# Patient Record
Sex: FEMALE | Race: BLACK OR AFRICAN AMERICAN | Employment: FULL TIME | URBAN - METROPOLITAN AREA
[De-identification: names, ages, dates, MRNs, and addresses within clinical notes are randomized per-mention and may not be internally consistent; named-entity substitution may affect disease eponyms.]

---

## 2017-06-14 ENCOUNTER — OFFICE VISIT (OUTPATIENT)
Dept: INTERNAL MEDICINE CLINIC | Age: 52
End: 2017-06-14

## 2017-06-14 VITALS
SYSTOLIC BLOOD PRESSURE: 115 MMHG | WEIGHT: 171.1 LBS | TEMPERATURE: 98.1 F | DIASTOLIC BLOOD PRESSURE: 68 MMHG | HEIGHT: 64 IN | BODY MASS INDEX: 29.21 KG/M2 | OXYGEN SATURATION: 98 % | RESPIRATION RATE: 16 BRPM | HEART RATE: 61 BPM

## 2017-06-14 DIAGNOSIS — D22.9 ATYPICAL MOLE: ICD-10-CM

## 2017-06-14 DIAGNOSIS — Z12.11 SCREENING FOR COLON CANCER: ICD-10-CM

## 2017-06-14 DIAGNOSIS — Z12.39 SCREENING FOR BREAST CANCER: ICD-10-CM

## 2017-06-14 DIAGNOSIS — I83.90 VARICOSE VEIN OF LEG: Primary | ICD-10-CM

## 2017-06-14 NOTE — MR AVS SNAPSHOT
Visit Information Date & Time Provider Department Dept. Phone Encounter #  
 6/14/2017  1:45 PM Puma Rojas MD [address] [facility] [phone] [id number] Follow-up Instructions Return in about 6 months (around 12/14/2017), or if symptoms worsen or fail to improve. Upcoming Health Maintenance Date Due DTaP/Tdap/Td series (1 - Tdap) 12/14/1987 BREAST CANCER SCRN MAMMOGRAM 12/14/2016 FOBT Q 1 YEAR AGE 50-75 12/14/2016 INFLUENZA AGE 9 TO ADULT 8/1/2017 PAP AKA CERVICAL CYTOLOGY 5/12/2019 Allergies as of 6/14/2017  Review Complete On: 6/14/2017 By: Puma Rojas MD  
  
 Severity Noted Reaction Type Reactions Nut Flavor High 11/19/2012    Anaphylaxis Cafergot [Ergotamine-caffeine]  11/19/2012    Nausea Only Current Immunizations  Never Reviewed No immunizations on file. Not reviewed this visit You Were Diagnosed With   
  
 Codes Comments Varicose vein of leg    -  Primary ICD-10-CM: I83.90 ICD-9-CM: 454.9 Atypical mole     ICD-10-CM: D22.9 ICD-9-CM: 216.9 Screening for breast cancer     ICD-10-CM: Z12.39 
ICD-9-CM: V76.10 Screening for colon cancer     ICD-10-CM: Z12.11 ICD-9-CM: V76.51 Vitals BP Pulse Temp Resp Height(growth percentile) Weight(growth percentile)  
 115/68 (BP 1 Location: Right arm, BP Patient Position: Sitting) 61 98.1 °F (36.7 °C) (Oral) 16 5' 4\" (1.626 m) 171 lb 1.6 oz (77.6 kg) SpO2 BMI OB Status Smoking Status 98% 29.37 kg/m2 Having regular periods Never Smoker Vitals History BMI and BSA Data Body Mass Index Body Surface Area  
 29.37 kg/m 2 1.87 m 2 Preferred Pharmacy Pharmacy Name Phone [address] [address] Your Updated Medication List  
  
   
This list is accurate as of: 6/14/17  2:43 PM.  Always use your most recent med list.  
  
  
  
  
 clobetasol 0.05 % ointment Commonly known as:  Ariadna Rape Apply  to affected area two (2) times a day. We Performed the Following CBC W/O DIFF [66388 CPT(R)] METABOLIC PANEL, COMPREHENSIVE [87229 CPT(R)] OCCULT BLOOD, IMMUNOASSAY (FIT) [id number] CPT(R)] REFERRAL TO DERMATOLOGY [REF19 Custom] Comments:  
 ballard REFERRAL TO GASTROENTEROLOGY [AKE37 Custom] Follow-up Instructions Return in about 6 months (around 12/14/2017), or if symptoms worsen or fail to improve. Referral Information Referral ID Referred By Referred To  
  
 [id number] Dean Cadena, [address] [facility] Art Reis MD   
   [facility] 56 [address] [facility], [address] [place] [address] Visits Status Start Date End Date 1 New Request 6/14/17 6/14/18 If your referral has a status of pending review or denied, additional information will be sent to support the outcome of this decision. Referral ID Referred By Referred To  
 [id number] Pamela Meek MD  
   [address] [facility] [address] [place] Al[place], [address] Phone: [phone] Fax: [phone] Visits Status Start Date End Date 1 New Request 6/14/17 6/14/18 If your referral has a status of pending review or denied, additional information will be sent to support the outcome of this decision. Patient Instructions [employer] Activation Thank you for requesting access to [email or web address]. Please follow the instructions below to securely access and download your online medical record. [email or web address] allows you to send messages to your doctor, view your test results, renew your prescriptions, schedule appointments, and more. How Do I Sign Up? 1. In your internet browser, go to [email or web address] 
2. Click on the First Time User? Click Here link in the Sign In box. You will be redirect to the New Member Sign Up page. 3. Enter your [email or web address] Access Code exactly as it appears below.  You will not need to use this code after youve completed the sign-up process. If you do not sign up before the expiration date, you must request a new code. MobileTag Access Code: [email or web address] Expires: 2017  2:39 PM (This is the date your MobileTag access code will ) 4. Enter the last four digits of your Social Security Number (xxxx) and Date of Birth (mm/dd/yyyy) as indicated and click Submit. You will be taken to the next sign-up page. 5. Create a CitalDoct ID. This will be your MobileTag login ID and cannot be changed, so think of one that is secure and easy to remember. 6. Create a MobileTag password. You can change your password at any time. 7. Enter your Password Reset Question and Answer. This can be used at a later time if you forget your password. 8. Enter your e-mail address. You will receive e-mail notification when new information is available in [address] 9. Click Sign Up. You can now view and download portions of your medical record. 10. Click the Download Summary menu link to download a portable copy of your medical information. Additional Information If you have questions, please visit the Frequently Asked Questions section of the MobileTag website at [email or web address] Remember, MobileTag is NOT to be used for urgent needs. For medical emergencies, dial 911. Introducing [facility]! Lion Meza introduces MobileTag patient portal. Now you can access parts of your medical record, email your doctor's office, and request medication refills online. 1. In your internet browser, go to [email or web address] 2. Click on the First Time User? Click Here link in the Sign In box. You will see the New Member Sign Up page. 3. Enter your MobileTag Access Code exactly as it appears below. You will not need to use this code after youve completed the sign-up process. If you do not sign up before the expiration date, you must request a new code. · Onformonics Access Code: [email or web address] Expires: 9/12/2017  2:39 PM 
 
4. Enter the last four digits of your Social Security Number (xxxx) and Date of Birth (mm/dd/yyyy) as indicated and click Submit. You will be taken to the next sign-up page. 5. Create a Onformonics ID. This will be your Onformonics login ID and cannot be changed, so think of one that is secure and easy to remember. 6. Create a Onformonics password. You can change your password at any time. 7. Enter your Password Reset Question and Answer. This can be used at a later time if you forget your password. 8. Enter your e-mail address. You will receive e-mail notification when new information is available in [address] 9. Click Sign Up. You can now view and download portions of your medical record. 10. Click the Download Summary menu link to download a portable copy of your medical information. If you have questions, please visit the Frequently Asked Questions section of the Onformonics website. Remember, Onformonics is NOT to be used for urgent needs. For medical emergencies, dial 911. Now available from your iPhone and Android! Please provide this summary of care documentation to your next provider. Your primary care clinician is listed as Antonio Arias. If you have any questions after today's visit, please call [phone].

## 2017-06-14 NOTE — PROGRESS NOTES
Chief Complaint   Patient presents with    Mole    Diabetes    Hypertension     1. Have you been to the ER, urgent care clinic since your last visit? Hospitalized since your last visit? No    2. Have you seen or consulted any other health care providers outside of the [address] [place] [address] since your last visit? Include any pap smears or colon screening.  No

## 2017-06-14 NOTE — PROGRESS NOTES
Tal Schmitz is a 49 y.o. female and presents with Mole and Ankle swelling  . Subjective:  She needs a physical exam    She has also recurrent ankle swelling    She has a nevus on the rt.lower back present for a few days    Health maintenance suggests the needs for a mammogram    Health maintenance suggests the needs for a test for occult blood        Review of Systems  Constitutional: negative for fevers, chills, anorexia and weight loss  Eyes:   negative for visual disturbance and irritation  ENT:   negative for tinnitus,sore throat,nasal congestion,ear pains. hoarseness  Respiratory:  negative for cough, hemoptysis, dyspnea,wheezing  CV:   negative for chest pain, palpitations, lower extremity edema  GI:   negative for nausea, vomiting, diarrhea, abdominal pain,melena  Endo:               negative for polyuria,polydipsia,polyphagia,heat intolerance  Genitourinary: negative for frequency, dysuria and hematuria  Integument:  negative for rash and pruritus  Hematologic:  negative for easy bruising and gum/nose bleeding  Musculoskel: negative for myalgias, arthralgias, back pain, muscle weakness, joint pain  Neurological:  negative for headaches, dizziness, vertigo, memory problems and gait   Behavl/Psych: negative for feelings of anxiety, depression, mood changes    No past medical history on file.   Past Surgical History:   Procedure Laterality Date    HX GYN       Social History     Social History    Marital status: SINGLE     Spouse name: N/A    Number of children: N/A    Years of education: N/A     Social History Main Topics    Smoking status: Never Smoker    Smokeless tobacco: None    Alcohol use No    Drug use: None    Sexual activity: Not Asked     Other Topics Concern    None     Social History Narrative     Family History   Problem Relation Age of Onset    Heart Disease Father      Current Outpatient Prescriptions   Medication Sig Dispense Refill    clobetasol (TEMOVATE) 0.05 % ointment Apply  to affected area two (2) times a day. 45 g 12     Allergies   Allergen Reactions    Nut Flavor Anaphylaxis    Cafergot [Ergotamine-Caffeine] Nausea Only       Objective:  Visit Vitals    /68 (BP 1 Location: Right arm, BP Patient Position: Sitting)    Pulse 61    Temp 98.1 °F (36.7 °C) (Oral)    Resp 16    Ht 5' 4\" (1.626 m)    Wt 171 lb 1.6 oz (77.6 kg)    SpO2 98%    BMI 29.37 kg/m2     Physical Exam:   General appearance - alert, well appearing, and in no distress  Mental status - alert, oriented to person, place, and time  EYE-MIRA, EOMI, corneas normal, no foreign bodies  ENT-ENT exam normal, no neck nodes or sinus tenderness  Nose - normal and patent, no erythema, discharge or polyps  Breast-no masses felt  Mouth - mucous membranes moist, pharynx normal without lesions  Neck - supple, no significant adenopathy   Chest - clear to auscultation, no wheezes, rales or rhonchi, symmetric air entry   Heart - normal rate, regular rhythm, normal S1, S2, no murmurs, rubs, clicks or gallops   Abdomen - soft, nontender, nondistended, no masses or organomegaly  Lymph- no adenopathy palpable  Ext-peripheral pulses normal, 2+edema rt.lower leg pedal edema, no clubbing or cyanosis  Skin-Warm and dry.  no hyperpigmentation, vitiligo, or suspicious lesions  Neuro -alert, oriented, normal speech, no focal findings or movement disorder noted  Neck-normal C-spine, no tenderness, full ROM without pain  Feet-no nail deformities or callus formation with good pulses noted  1cm nevus noted rt.flank    Results for orders placed or performed in visit on 95/26/33   METABOLIC PANEL, COMPREHENSIVE   Result Value Ref Range    Glucose 87 65 - 99 mg/dL    BUN 13 6 - 24 mg/dL    Creatinine 0.78 0.57 - 1.00 mg/dL    GFR est non-AA 90 >59 mL/min/1.73    GFR est  >59 mL/min/1.73    BUN/Creatinine ratio 17 9 - 23    Sodium 137 134 - 144 mmol/L    Potassium 5.0 3.5 - 5.2 mmol/L    Chloride 103 97 - 108 mmol/L    CO2 21 18 - 29 mmol/L    Calcium 9.4 8.7 - 10.2 mg/dL    Protein, total 7.4 6.0 - 8.5 g/dL    Albumin 4.3 3.5 - 5.5 g/dL    GLOBULIN, TOTAL 3.1 1.5 - 4.5 g/dL    A-G Ratio 1.4 1.1 - 2.5    Bilirubin, total 0.4 0.0 - 1.2 mg/dL    Alk. phosphatase 37 (L) 39 - 117 IU/L    AST (SGOT) 20 0 - 40 IU/L    ALT (SGPT) 14 0 - 32 IU/L   CBC W/O DIFF   Result Value Ref Range    WBC 6.1 3.4 - 10.8 x10E3/uL    RBC 4.92 3.77 - 5.28 x10E6/uL    HGB 11.7 11.1 - 15.9 g/dL    HCT 39.4 34.0 - 46.6 %    MCV 80 79 - 97 fL    MCH 23.8 (L) 26.6 - 33.0 pg    MCHC 29.7 (L) 31.5 - 35.7 g/dL    RDW 17.7 (H) 12.3 - 15.4 %    PLATELET 190 916 - 331 x10E3/uL   AMB POC LIPID PROFILE   Result Value Ref Range    Cholesterol (POC) 170     Triglycerides (POC) 68     HDL Cholesterol (POC) 56     LDL Cholesterol (POC) 100     Non-HDL Goal (POC) 114     TChol/HDL Ratio (POC) 3.0    AMB POC GLUCOSE BLOOD, BY GLUCOSE MONITORING DEVICE   Result Value Ref Range    Glucose  mg/dL   AMB POC HEMOGLOBIN A1C   Result Value Ref Range    Hemoglobin A1c (POC) 5.6 %       Assessment/Plan:    ICD-10-CM ICD-9-CM    1. Varicose vein of leg X87.82 585.2 METABOLIC PANEL, COMPREHENSIVE      CBC W/O DIFF   2. Atypical mole D22.9 216.9 REFERRAL TO DERMATOLOGY   3. Screening for breast cancer Z12.39 V76.10 OCCULT BLOOD, IMMUNOASSAY (FIT)   4.  Screening for colon cancer Z12.11 V76.51 REFERRAL TO GASTROENTEROLOGY     Orders Placed This Encounter    METABOLIC PANEL, COMPREHENSIVE    CBC W/O DIFF    OCCULT BLOOD, IMMUNOASSAY (FIT)    REFERRAL TO DERMATOLOGY     Referral Priority:   Routine     Referral Type:   Consultation     Referral Reason:   Specialty Services Required     Referral Location:   Chuck Cummings MD     Referred to Provider:   Chuck Cummings MD     Number of Visits Requested:   1    REFERRAL TO GASTROENTEROLOGY     Referral Priority:   Routine     Referral Type:   Consultation     Referral Reason:   Specialty Services Required     Referred to Provider:   Yasmine Rodriguez Myranda Mccray MD     Number of Visits Requested:   1     lose weight, increase physical activity, follow low fat diet, follow low salt diet,Take 81mg aspirin daily  Patient Instructions   MyChart Activation    Thank you for requesting access to [employer]. Please follow the instructions below to securely access and download your online medical record. [employer] allows you to send messages to your doctor, view your test results, renew your prescriptions, schedule appointments, and more. How Do I Sign Up? 1. In your internet browser, go to [email or web address]  2. Click on the First Time User? Click Here link in the Sign In box. You will be redirect to the New Member Sign Up page. 3. Enter your [employer] Access Code exactly as it appears below. You will not need to use this code after youve completed the sign-up process. If you do not sign up before the expiration date, you must request a new code. [employer] Access Code: [email or web address]  Expires: 2017  2:39 PM (This is the date your [employer] access code will )    4. Enter the last four digits of your Social Security Number (xxxx) and Date of Birth (mm/dd/yyyy) as indicated and click Submit. You will be taken to the next sign-up page. 5. Create a [employer] ID. This will be your [employer] login ID and cannot be changed, so think of one that is secure and easy to remember. 6. Create a [employer] password. You can change your password at any time. 7. Enter your Password Reset Question and Answer. This can be used at a later time if you forget your password. 8. Enter your e-mail address. You will receive e-mail notification when new information is available in [address] 9. Click Sign Up. You can now view and download portions of your medical record. 10. Click the Download Summary menu link to download a portable copy of your medical information.     Additional Information    If you have questions, please visit the Frequently Asked Questions section of the [employer] website at [email or web address] Remember, [employer] is NOT to be used for urgent needs. For medical emergencies, dial 911. Follow-up Disposition:  Return in about 6 months (around 12/14/2017), or if symptoms worsen or fail to improve. I have reviewed with the patient details of the assessment and plan and all questions were answered. Relevent patient education was performed. The most recent lab findings were reviewed with the patient. An After Visit Summary was printed and given to the patient.

## 2017-06-14 NOTE — PATIENT INSTRUCTIONS
[employer] Activation    Thank you for requesting access to [employer]. Please follow the instructions below to securely access and download your online medical record. [employer] allows you to send messages to your doctor, view your test results, renew your prescriptions, schedule appointments, and more. How Do I Sign Up? 1. In your internet browser, go to [email or web address]  2. Click on the First Time User? Click Here link in the Sign In box. You will be redirect to the New Member Sign Up page. 3. Enter your [employer] Access Code exactly as it appears below. You will not need to use this code after youve completed the sign-up process. If you do not sign up before the expiration date, you must request a new code. [employer] Access Code: [email or web address]  Expires: 2017  2:39 PM (This is the date your [employer] access code will )    4. Enter the last four digits of your Social Security Number (xxxx) and Date of Birth (mm/dd/yyyy) as indicated and click Submit. You will be taken to the next sign-up page. 5. Create a [employer] ID. This will be your [employer] login ID and cannot be changed, so think of one that is secure and easy to remember. 6. Create a [employer] password. You can change your password at any time. 7. Enter your Password Reset Question and Answer. This can be used at a later time if you forget your password. 8. Enter your e-mail address. You will receive e-mail notification when new information is available in [address] 9. Click Sign Up. You can now view and download portions of your medical record. 10. Click the Download Summary menu link to download a portable copy of your medical information. Additional Information    If you have questions, please visit the Frequently Asked Questions section of the [employer] website at [email or web address] Remember, [employer] is NOT to be used for urgent needs. For medical emergencies, dial 911.

## 2017-06-15 LAB
ALBUMIN SERPL-MCNC: 4.5 G/DL (ref 3.5–5.5)
ALBUMIN/GLOB SERPL: 1.4 {RATIO} (ref 1.2–2.2)
ALP SERPL-CCNC: 48 IU/L (ref 39–117)
ALT SERPL-CCNC: 22 IU/L (ref 0–32)
AST SERPL-CCNC: 22 IU/L (ref 0–40)
BILIRUB SERPL-MCNC: 0.3 MG/DL (ref 0–1.2)
BUN SERPL-MCNC: 17 MG/DL (ref 6–24)
BUN/CREAT SERPL: 22 (ref 9–23)
CALCIUM SERPL-MCNC: 9.5 MG/DL (ref 8.7–10.2)
CHLORIDE SERPL-SCNC: 101 MMOL/L (ref 96–106)
CO2 SERPL-SCNC: 23 MMOL/L (ref 18–29)
CREAT SERPL-MCNC: 0.79 MG/DL (ref 0.57–1)
ERYTHROCYTE [DISTWIDTH] IN BLOOD BY AUTOMATED COUNT: 17.9 % (ref 12.3–15.4)
GLOBULIN SER CALC-MCNC: 3.2 G/DL (ref 1.5–4.5)
GLUCOSE SERPL-MCNC: 90 MG/DL (ref 65–99)
HCT VFR BLD AUTO: 38.6 % (ref 34–46.6)
HGB BLD-MCNC: 11.7 G/DL (ref 11.1–15.9)
MCH RBC QN AUTO: 23.9 PG (ref 26.6–33)
MCHC RBC AUTO-ENTMCNC: 30.3 G/DL (ref 31.5–35.7)
MCV RBC AUTO: 79 FL (ref 79–97)
PLATELET # BLD AUTO: 294 X10E3/UL (ref 150–379)
POTASSIUM SERPL-SCNC: 4.7 MMOL/L (ref 3.5–5.2)
PROT SERPL-MCNC: 7.7 G/DL (ref 6–8.5)
RBC # BLD AUTO: 4.89 X10E6/UL (ref 3.77–5.28)
SODIUM SERPL-SCNC: 139 MMOL/L (ref 134–144)
WBC # BLD AUTO: 7.5 X10E3/UL (ref 3.4–10.8)

## 2017-07-19 ENCOUNTER — OFFICE VISIT (OUTPATIENT)
Dept: INTERNAL MEDICINE CLINIC | Age: 52
End: 2017-07-19

## 2017-07-19 VITALS
TEMPERATURE: 99 F | HEART RATE: 60 BPM | RESPIRATION RATE: 14 BRPM | SYSTOLIC BLOOD PRESSURE: 120 MMHG | DIASTOLIC BLOOD PRESSURE: 80 MMHG | WEIGHT: 174 LBS | HEIGHT: 64 IN | BODY MASS INDEX: 29.71 KG/M2 | OXYGEN SATURATION: 97 %

## 2017-07-19 DIAGNOSIS — J01.90 ACUTE NON-RECURRENT SINUSITIS, UNSPECIFIED LOCATION: Primary | ICD-10-CM

## 2017-07-19 DIAGNOSIS — H91.90 HEARING DECREASED, UNSPECIFIED LATERALITY: ICD-10-CM

## 2017-07-19 RX ORDER — FLUTICASONE PROPIONATE 50 MCG
2 SPRAY, SUSPENSION (ML) NASAL DAILY
Qty: 1 BOTTLE | Refills: 12 | Status: SHIPPED | OUTPATIENT
Start: 2017-07-19 | End: 2021-04-03

## 2017-07-19 RX ORDER — CETIRIZINE HCL 10 MG
10 TABLET ORAL DAILY
Qty: 30 TAB | Refills: 12 | Status: SHIPPED | OUTPATIENT
Start: 2017-07-19 | End: 2021-04-03

## 2017-07-19 NOTE — PATIENT INSTRUCTIONS
[employer] Activation    Thank you for requesting access to [employer]. Please follow the instructions below to securely access and download your online medical record. [employer] allows you to send messages to your doctor, view your test results, renew your prescriptions, schedule appointments, and more. How Do I Sign Up? 1. In your internet browser, go to [email or web address].[email or web address] Company Limited  2. Click on the First Time User? Click Here link in the Sign In box. You will be redirect to the New Member Sign Up page. 3. Enter your [employer] Access Code exactly as it appears below. You will not need to use this code after youve completed the sign-up process. If you do not sign up before the expiration date, you must request a new code. [employer] Access Code: Activation code not generated  Current [employer] Status: Patient Declined (This is the date your [employer] access code will )    4. Enter the last four digits of your Social Security Number (xxxx) and Date of Birth (mm/dd/yyyy) as indicated and click Submit. You will be taken to the next sign-up page. 5. Create a [employer] ID. This will be your [employer] login ID and cannot be changed, so think of one that is secure and easy to remember. 6. Create a [employer] password. You can change your password at any time. 7. Enter your Password Reset Question and Answer. This can be used at a later time if you forget your password. 8. Enter your e-mail address. You will receive e-mail notification when new information is available in [address] 9. Click Sign Up. You can now view and download portions of your medical record. 10. Click the Download Summary menu link to download a portable copy of your medical information. Additional Information    If you have questions, please visit the Frequently Asked Questions section of the [employer] website at [email or web address] Remember, [employer] is NOT to be used for urgent needs. For medical emergencies, dial 911.

## 2017-07-19 NOTE — MR AVS SNAPSHOT
Visit Information Date & Time Provider Department Dept. Phone Encounter #  
 7/19/2017 11:30 AM Larence Spatz., MD [address] [place] [phone] [id number] Follow-up Instructions Return in about 3 months (around 10/19/2017), or if symptoms worsen or fail to improve. Follow-up and Disposition History Your Appointments 7/19/2017 11:30 AM  
ACUTE CARE with Larence Spatz., MD  
[facility] - [place] ([address]) Appt Note: ear issues [address] [facility] [facility] [address] [address]  
[phone]  
  
   
 [address] [place] [address],[address] [place] [phone] Upcoming Health Maintenance Date Due DTaP/Tdap/Td series (1 - Tdap) 12/14/1987 BREAST CANCER SCRN MAMMOGRAM 12/14/2016 FOBT Q 1 YEAR AGE 50-75 12/14/2016 INFLUENZA AGE 9 TO ADULT 8/1/2017 PAP AKA CERVICAL CYTOLOGY 5/12/2019 Allergies as of 7/19/2017  Review Complete On: 7/19/2017 By: Larence Spatz., MD  
  
 Severity Noted Reaction Type Reactions Nut Flavor High 11/19/2012    Anaphylaxis Nuts [Tree Nut] High 07/19/2017    Anaphylaxis Cafergot [Ergotamine-caffeine]  11/19/2012    Nausea Only Current Immunizations  Never Reviewed No immunizations on file. Not reviewed this visit You Were Diagnosed With   
  
 Codes Comments Acute non-recurrent sinusitis, unspecified location    -  Primary ICD-10-CM: J01.90 ICD-9-CM: 461.9 Vitals BP Pulse Temp Resp Height(growth percentile) Weight(growth percentile) 120/80 60 99 °F (37.2 °C) (Oral) 14 5' 4\" (1.626 m) 174 lb (78.9 kg) SpO2 BMI OB Status Smoking Status 97% 29.87 kg/m2 Having regular periods Never Smoker BMI and BSA Data Body Mass Index Body Surface Area  
 29.87 kg/m 2 1.89 m 2 Preferred Pharmacy Pharmacy Name Phone [address] [address] Your Updated Medication List  
  
   
This list is accurate as of: 17 11:01 AM.  Always use your most recent med list.  
  
  
  
  
 cetirizine 10 mg tablet Commonly known as:  ZYRTEC Take 1 Tab by mouth daily. clobetasol 0.05 % ointment Commonly known as:  Denisha Viramontes Apply  to affected area two (2) times a day. fluticasone 50 mcg/actuation nasal spray Commonly known as:  Elian Rivera 2 Sprays by Both Nostrils route daily. Prescriptions Printed Refills  
 fluticasone (FLONASE) 50 mcg/actuation nasal spray 12 Si Sprays by Both Nostrils route daily. Class: Print Route: Both Nostrils  
 cetirizine (ZYRTEC) 10 mg tablet 12 Sig: Take 1 Tab by mouth daily. Class: Print Route: Oral  
  
Follow-up Instructions Return in about 3 months (around 10/19/2017), or if symptoms worsen or fail to improve. Patient Instructions [employer] Activation Thank you for requesting access to [employer]. Please follow the instructions below to securely access and download your online medical record. [employer] allows you to send messages to your doctor, view your test results, renew your prescriptions, schedule appointments, and more. How Do I Sign Up? 1. In your internet browser, go to [email or web address] 
2. Click on the First Time User? Click Here link in the Sign In box. You will be redirect to the New Member Sign Up page. 3. Enter your [employer] Access Code exactly as it appears below. You will not need to use this code after youve completed the sign-up process. If you do not sign up before the expiration date, you must request a new code. [employer] Access Code: Activation code not generated Current [employer] Status: Patient Declined (This is the date your [employer] access code will ) 4. Enter the last four digits of your Social Security Number (xxxx) and Date of Birth (mm/dd/yyyy) as indicated and click Submit.  You will be taken to the next sign-up page. 5. Create a [employer] ID. This will be your [employer] login ID and cannot be changed, so think of one that is secure and easy to remember. 6. Create a [employer] password. You can change your password at any time. 7. Enter your Password Reset Question and Answer. This can be used at a later time if you forget your password. 8. Enter your e-mail address. You will receive e-mail notification when new information is available in [address]. 9. Click Sign Up. You can now view and download portions of your medical record. 10. Click the Download Summary menu link to download a portable copy of your medical information. Additional Information If you have questions, please visit the Frequently Asked Questions section of the [employer] website at [email or web address] Remember, [employer] is NOT to be used for urgent needs. For medical emergencies, dial 911. Please provide this summary of care documentation to your next provider. Your primary care clinician is listed as Sandy Roman. If you have any questions after today's visit, please call [phone].

## 2017-07-19 NOTE — PROGRESS NOTES
Kimberley Hensley is a 49 y.o. female and presents with Other (ear problem)  . Subjective:  She has had decreased hearing sensation after swimming recently. She has no pains reported. Review of Systems  Constitutional: negative for fevers, chills, anorexia and weight loss  Eyes:   negative for visual disturbance and irritation  ENT:   negative for tinnitus,sore throat,nasal congestion,ear pains. hoarseness  Respiratory:  negative for cough, hemoptysis, dyspnea,wheezing  CV:   negative for chest pain, palpitations, lower extremity edema  GI:   negative for nausea, vomiting, diarrhea, abdominal pain,melena  Endo:               negative for polyuria,polydipsia,polyphagia,heat intolerance  Genitourinary: negative for frequency, dysuria and hematuria  Integument:  negative for rash and pruritus  Hematologic:  negative for easy bruising and gum/nose bleeding  Musculoskel: negative for myalgias, arthralgias, back pain, muscle weakness, joint pain  Neurological:  negative for headaches, dizziness, vertigo, memory problems and gait   Behavl/Psych: negative for feelings of anxiety, depression, mood changes    History reviewed. No pertinent past medical history. Past Surgical History:   Procedure Laterality Date    HX GYN       Social History     Social History    Marital status: SINGLE     Spouse name: N/A    Number of children: N/A    Years of education: N/A     Social History Main Topics    Smoking status: Never Smoker    Smokeless tobacco: Never Used    Alcohol use No    Drug use: None    Sexual activity: Not Asked     Other Topics Concern    None     Social History Narrative     Family History   Problem Relation Age of Onset    Heart Disease Father      Current Outpatient Prescriptions   Medication Sig Dispense Refill    clobetasol (TEMOVATE) 0.05 % ointment Apply  to affected area two (2) times a day.  45 g 12     Allergies   Allergen Reactions    Nut Flavor Anaphylaxis    Nuts [Tree Nut] Anaphylaxis  Cafergot [Ergotamine-Caffeine] Nausea Only       Objective:  Visit Vitals    /80    Pulse 60    Temp 99 °F (37.2 °C) (Oral)    Resp 14    Ht 5' 4\" (1.626 m)    Wt 174 lb (78.9 kg)    SpO2 97%    BMI 29.87 kg/m2     Physical Exam:   General appearance - alert, well appearing, and in no distress  Mental status - alert, oriented to person, place, and time  EYE-MIRA, EOMI, corneas normal, no foreign bodies  ENT-ENT exam normal, no neck nodes or sinus tenderness  Nose - normal and patent, no erythema, discharge or polyps  Mouth - mucous membranes moist, pharynx normal without lesions  Neck - supple, no significant adenopathy   Chest - clear to auscultation, no wheezes, rales or rhonchi, symmetric air entry   Heart - normal rate, regular rhythm, normal S1, S2, no murmurs, rubs, clicks or gallops   Abdomen - soft, nontender, nondistended, no masses or organomegaly  Lymph- no adenopathy palpable  Ext-peripheral pulses normal, no pedal edema, no clubbing or cyanosis  Skin-Warm and dry. no hyperpigmentation, vitiligo, or suspicious lesions  Neuro -alert, oriented, normal speech, no focal findings or movement disorder noted  Neck-normal C-spine, no tenderness, full ROM without pain  Feet-no nail deformities or callus formation with good pulses noted      Results for orders placed or performed in visit on 24/24/75   METABOLIC PANEL, COMPREHENSIVE   Result Value Ref Range    Glucose 90 65 - 99 mg/dL    BUN 17 6 - 24 mg/dL    Creatinine 0.79 0.57 - 1.00 mg/dL    GFR est non-AA 88 >59 mL/min/1.73    GFR est  >59 mL/min/1.73    BUN/Creatinine ratio 22 9 - 23    Sodium 139 134 - 144 mmol/L    Potassium 4.7 3.5 - 5.2 mmol/L    Chloride 101 96 - 106 mmol/L    CO2 23 18 - 29 mmol/L    Calcium 9.5 8.7 - 10.2 mg/dL    Protein, total 7.7 6.0 - 8.5 g/dL    Albumin 4.5 3.5 - 5.5 g/dL    GLOBULIN, TOTAL 3.2 1.5 - 4.5 g/dL    A-G Ratio 1.4 1.2 - 2.2    Bilirubin, total 0.3 0.0 - 1.2 mg/dL    Alk.  phosphatase 48 39 - 117 IU/L    AST (SGOT) 22 0 - 40 IU/L    ALT (SGPT) 22 0 - 32 IU/L   CBC W/O DIFF   Result Value Ref Range    WBC 7.5 3.4 - 10.8 x10E3/uL    RBC 4.89 3.77 - 5.28 x10E6/uL    HGB 11.7 11.1 - 15.9 g/dL    HCT 38.6 34.0 - 46.6 %    MCV 79 79 - 97 fL    MCH 23.9 (L) 26.6 - 33.0 pg    MCHC 30.3 (L) 31.5 - 35.7 g/dL    RDW 17.9 (H) 12.3 - 15.4 %    PLATELET 781 500 - 121 x10E3/uL       Assessment/Plan:  No diagnosis found. No orders of the defined types were placed in this encounter. lose weight, increase physical activity, continue present plan,Take 81mg aspirin daily  There are no Patient Instructions on file for this visit. Follow-up Disposition: Not on File      I have reviewed with the patient details of the assessment and plan and all questions were answered. Relevent patient education was performed. The most recent lab findings were reviewed with the patient. An After Visit Summary was printed and given to the patient.

## 2017-07-31 ENCOUNTER — ANESTHESIA (OUTPATIENT)
Dept: ENDOSCOPY | Age: 52
End: 2017-07-31
Payer: COMMERCIAL

## 2017-07-31 ENCOUNTER — APPOINTMENT (OUTPATIENT)
Dept: GENERAL RADIOLOGY | Age: 52
End: 2017-07-31
Payer: COMMERCIAL

## 2017-07-31 ENCOUNTER — ANESTHESIA EVENT (OUTPATIENT)
Dept: ENDOSCOPY | Age: 52
End: 2017-07-31
Payer: COMMERCIAL

## 2017-07-31 ENCOUNTER — HOSPITAL ENCOUNTER (OUTPATIENT)
Age: 52
Setting detail: OUTPATIENT SURGERY
Discharge: HOME | End: 2017-07-31
Attending: INTERNAL MEDICINE | Admitting: INTERNAL MEDICINE
Payer: COMMERCIAL

## 2017-07-31 VITALS
HEART RATE: 51 BPM | WEIGHT: 174.38 LBS | DIASTOLIC BLOOD PRESSURE: 78 MMHG | HEIGHT: 65 IN | RESPIRATION RATE: 18 BRPM | BODY MASS INDEX: 29.05 KG/M2 | OXYGEN SATURATION: 100 % | SYSTOLIC BLOOD PRESSURE: 128 MMHG | TEMPERATURE: 97.9 F

## 2017-07-31 LAB — HCG UR QL: NEGATIVE

## 2017-07-31 PROCEDURE — 77030013992 HC SNR POLYP ENDOSC BSC -B: Performed by: INTERNAL MEDICINE

## 2017-07-31 PROCEDURE — 88305 TISSUE EXAM BY PATHOLOGIST: CPT | Performed by: INTERNAL MEDICINE

## 2017-07-31 PROCEDURE — 74011000250 HC RX REV CODE- 250

## 2017-07-31 PROCEDURE — 76060000031 HC ANESTHESIA FIRST 0.5 HR: Performed by: INTERNAL MEDICINE

## 2017-07-31 PROCEDURE — 74011250636 HC RX REV CODE- 250/636: Performed by: INTERNAL MEDICINE

## 2017-07-31 PROCEDURE — 81025 URINE PREGNANCY TEST: CPT

## 2017-07-31 PROCEDURE — 74011250636 HC RX REV CODE- 250/636

## 2017-07-31 PROCEDURE — 76040000019: Performed by: INTERNAL MEDICINE

## 2017-07-31 RX ORDER — LORAZEPAM 2 MG/ML
2 INJECTION INTRAMUSCULAR AS NEEDED
Status: DISCONTINUED | OUTPATIENT
Start: 2017-07-31 | End: 2017-07-31 | Stop reason: HOSPADM

## 2017-07-31 RX ORDER — EPINEPHRINE 0.1 MG/ML
1 INJECTION INTRACARDIAC; INTRAVENOUS
Status: DISCONTINUED | OUTPATIENT
Start: 2017-07-31 | End: 2017-07-31 | Stop reason: HOSPADM

## 2017-07-31 RX ORDER — SODIUM CHLORIDE 0.9 % (FLUSH) 0.9 %
5-10 SYRINGE (ML) INJECTION AS NEEDED
Status: DISCONTINUED | OUTPATIENT
Start: 2017-07-31 | End: 2017-07-31 | Stop reason: HOSPADM

## 2017-07-31 RX ORDER — MIDAZOLAM HYDROCHLORIDE 1 MG/ML
5 INJECTION, SOLUTION INTRAMUSCULAR; INTRAVENOUS
Status: DISCONTINUED | OUTPATIENT
Start: 2017-07-31 | End: 2017-07-31 | Stop reason: HOSPADM

## 2017-07-31 RX ORDER — LIDOCAINE HYDROCHLORIDE 20 MG/ML
5 SOLUTION OROPHARYNGEAL AS NEEDED
Status: DISCONTINUED | OUTPATIENT
Start: 2017-07-31 | End: 2017-07-31 | Stop reason: HOSPADM

## 2017-07-31 RX ORDER — ATROPINE SULFATE 0.1 MG/ML
0.5 INJECTION INTRAVENOUS
Status: DISCONTINUED | OUTPATIENT
Start: 2017-07-31 | End: 2017-07-31 | Stop reason: HOSPADM

## 2017-07-31 RX ORDER — FLUMAZENIL 0.1 MG/ML
0.2 INJECTION INTRAVENOUS
Status: DISCONTINUED | OUTPATIENT
Start: 2017-07-31 | End: 2017-07-31 | Stop reason: HOSPADM

## 2017-07-31 RX ORDER — SODIUM CHLORIDE 9 MG/ML
100 INJECTION, SOLUTION INTRAVENOUS CONTINUOUS
Status: DISCONTINUED | OUTPATIENT
Start: 2017-07-31 | End: 2017-07-31 | Stop reason: HOSPADM

## 2017-07-31 RX ORDER — SODIUM CHLORIDE 0.9 % (FLUSH) 0.9 %
5-10 SYRINGE (ML) INJECTION EVERY 8 HOURS
Status: DISCONTINUED | OUTPATIENT
Start: 2017-07-31 | End: 2017-07-31 | Stop reason: HOSPADM

## 2017-07-31 RX ORDER — FENTANYL CITRATE 50 UG/ML
100 INJECTION, SOLUTION INTRAMUSCULAR; INTRAVENOUS ONCE
Status: DISCONTINUED | OUTPATIENT
Start: 2017-07-31 | End: 2017-07-31 | Stop reason: HOSPADM

## 2017-07-31 RX ORDER — DIPHENHYDRAMINE HYDROCHLORIDE 50 MG/ML
50 INJECTION, SOLUTION INTRAMUSCULAR; INTRAVENOUS ONCE
Status: DISCONTINUED | OUTPATIENT
Start: 2017-07-31 | End: 2017-07-31 | Stop reason: HOSPADM

## 2017-07-31 RX ORDER — DEXTROMETHORPHAN/PSEUDOEPHED 2.5-7.5/.8
1.2 DROPS ORAL
Status: DISCONTINUED | OUTPATIENT
Start: 2017-07-31 | End: 2017-07-31 | Stop reason: HOSPADM

## 2017-07-31 RX ORDER — NALOXONE HYDROCHLORIDE 0.4 MG/ML
0.4 INJECTION, SOLUTION INTRAMUSCULAR; INTRAVENOUS; SUBCUTANEOUS
Status: DISCONTINUED | OUTPATIENT
Start: 2017-07-31 | End: 2017-07-31 | Stop reason: HOSPADM

## 2017-07-31 RX ORDER — DEXTROSE MONOHYDRATE AND SODIUM CHLORIDE 5; .9 G/100ML; G/100ML
100 INJECTION, SOLUTION INTRAVENOUS CONTINUOUS
Status: DISCONTINUED | OUTPATIENT
Start: 2017-07-31 | End: 2017-07-31 | Stop reason: HOSPADM

## 2017-07-31 RX ORDER — LIDOCAINE HYDROCHLORIDE 20 MG/ML
INJECTION, SOLUTION EPIDURAL; INFILTRATION; INTRACAUDAL; PERINEURAL AS NEEDED
Status: DISCONTINUED | OUTPATIENT
Start: 2017-07-31 | End: 2017-07-31 | Stop reason: HOSPADM

## 2017-07-31 RX ORDER — PROPOFOL 10 MG/ML
INJECTION, EMULSION INTRAVENOUS AS NEEDED
Status: DISCONTINUED | OUTPATIENT
Start: 2017-07-31 | End: 2017-07-31 | Stop reason: HOSPADM

## 2017-07-31 RX ADMIN — SODIUM CHLORIDE 100 ML/HR: 900 INJECTION, SOLUTION INTRAVENOUS at 08:54

## 2017-07-31 RX ADMIN — LIDOCAINE HYDROCHLORIDE 40 MG: 20 INJECTION, SOLUTION EPIDURAL; INFILTRATION; INTRACAUDAL; PERINEURAL at 09:15

## 2017-07-31 RX ADMIN — PROPOFOL 190 MG: 10 INJECTION, EMULSION INTRAVENOUS at 09:29

## 2017-07-31 NOTE — H&P
G I Procedure Note           Endoscopy History and Physical               Dr. Su Bryant [address] [facility] [address] [address] [id number]  [id number]    1965  49 y.o.  female      Date of Procedure:   Preoperative Diagnosis:       Procedure:    7/31/2017           SCREENING                              Procedure(s):  COLONOSCOPY      Gastroenterologist:  Anesthesia:           Olivia Lozada MD                               MAC            History and procedure indication:  Nargis Roman is a 49 y.o. BLACK OR  female who presents with: SCREENING   including the additional history of Screening ,Screening for colon cancer, ,        No past medical history on file. Prior to Admission medications    Medication Sig Start Date End Date Taking? Authorizing Provider   fluticasone (FLONASE) 50 mcg/actuation nasal spray 2 Sprays by Both Nostrils route daily. 7/19/17   Kwame Dcaosta MD   cetirizine (ZYRTEC) 10 mg tablet Take 1 Tab by mouth daily. 7/19/17   Kwame Dacosta MD   clobetasol (TEMOVATE) 0.05 % ointment Apply  to affected area two (2) times a day. 3/31/14   Kwame Dacosta MD     Allergies   Allergen Reactions    Nut Flavor Anaphylaxis   Catha Sprout Eneida Brilliant Celestia Juan José Nut] Anaphylaxis    Cafergot [Ergotamine-Caffeine] Nausea Only       Past Surgical History:   Procedure Laterality Date    HX GYN       Family History   Problem Relation Age of Onset    Heart Disease Father       Social History   Substance Use Topics    Smoking status: Never Smoker    Smokeless tobacco: Never Used    Alcohol use No                                                      PHYSICAL EXAM   There were no vitals taken for this visit.     General appearance:  alert, well appearing, and in no distress  Mental status:  normal mood, behavior, speech, dress, motor activity and thought processes  Nose: normal and patent, no erythema, discharge or polyps  Mouth:- mucous membranes moist, pharynx normal without lesions                  [x]  No Loose teeth      []    Loose teeth  Finger opening:  []1     []1.5    [] 2     [] 2.5     [x] 3      [] 3.5     [] 4   Mallampati:         [] Class 1     [x] Class 2    [] Class 3      [] Class 4      Neck - supple,      [x] Full ROM [] Decreased ROM  [] Short Neck no significant adenopathy    Chest - clear to auscultation, no wheezes, rales or rhonchi, symmetric air entry  Heart: normal rate, regular rhythm, normal S1, S2, no murmurs, rubs, clicks or gallops  Abdomen: abdomen soft, bowel sounds  [x] normal  [] increased  [] hypoactive                       [] no tenderness  [] epigastric tenderness  [] LLQ tenderness   [] RLQ tenderness                      No masses, organomegaly or guarding. Rectal exam: negative without mass, lesions or tenderness  Extremities: peripheral pulses normal, no pedal edema, no clubbing or cyanosis  Neurologic: Alert and oriented to person, place, and time; normal strength and tone. Normal symmetric reflexes  Normal gait:                                      Assessement:                                 Pre op dx:  SCREENING   Additional medical problems list below   There is no problem list on file for this patient. This note documentation was performed prior to this planned procedure       after a history and physical was performed in the office.  Date: 7/17/17                   Pre Procedure Evaluation (per anesthesia or per h&p)                                                Sedation/Assessment:                                                                                               Mallampati Classification                       *     []Class 1                    []Class 2                    [] Class 3                  [] Class 4                                              ASA classfication         []     Class I: Normally healthy         []     Class II: Patient with mild systemic disease (e.g. hypertension)         []     Class III: Patient with severe systemic disease (e.g. CHF), non-decompensated         []     Class IV: Patient with severe systemic disease, decompensated         []     Class V: Moribund patient, survival unlikely                     Plan:  []  Egd                               [x] Colonoscopy                               [] with Moderate Sedation /Conscious Sedation                                 [x] MAC          Patient stable for planned procedure. See orders.      Ty Fritz MD

## 2017-07-31 NOTE — PROCEDURES
G I Procedure Note            COLONOSCOPY   Dr. Lucy Cuadra office   [facility] Office    [facility]                                   [id number]                                  [id number]   1965                                      49 y.o.                                    female      Procedure Date: 7/31/2017                                                                                                              Pre Op Diagnosis:                     1. SCREENING                                                                                                                                                                          Post Op Diagnosis:                    1. 1.5 peduncalated polyp removed internal       hemorrhoids                                                                    H&p completed: Yes            Anesthesia Assessment: Performed prior to procedure:      No change  Anesthesia Plan: Performed prior to procedure:                   No change       Medications: See Reviewed List and Reconcilation           Informed consent was obtained     Risk Statement:  Prior to the procedure the risks were explained to the patient and/or to the family including but not limited to perforation, bleeding, adverse drug reaction, aspiration, and even the need for possible surgery. A colonoscopy exam is not 100% accurate which may be related to preparation or blind spots during the exam.The possibility that an abnormality and /or cancer could be missed was also discussed as well as alternative x-ray options.          Instrument:    Olympus adult Videocolonoscope                                   Immediate Procedure Reassessment Completed     With the patient in the left lateral position, a rectal examination was performed and the findings were: negative without mass, lesions or tenderness   The Olympus Video colonoscope was inserted under direct vision into the rectum. The colonoscope was passed from the rectum to the cecum, which was identified by the ileocecal valve. The colon findings demonstrated:  ANUS: Anal exam reveals no masses or external hemorrhoids, sphincter tone is normal.   RECTUM: Rectal exam reveals no masses  . SIGMOID COLON: The mucosa is normal with good vascular pattern and without ulcers, diverticula, and polyps. DESCENDING COLON: The mucosa is normal with good vascular pattern and without ulcers, diverticula, and polyps. SPLENIC FLEXURE: The splenic flexure is normal.   TRANSVERSE COLON: The mucosa is normal with good vascular pattern and without ulcers, diverticula, and polyps. HEPATIC FLEXURE: The hepatic flexure is normal.   ASCENDING COLON: The mucosa is normal with good vascular pattern and without ulcers, diverticula, and polyps. CECUM:  The ileocecal valve appears normal.   TERMINAL ILEUM: The terminal ileum was not entered. A polyp was identified. 15  mm in size, located in the sigmoid removed by snare cautery and retrieved for pathology The polypectomy site was reviewed and was free of hemorrhage. The colonoscope was slowly withdrawn >6 minute period and the instrument was retroflexed in the rectum. The rectal findings were:Protruding lesions:     -Internal Hemorrhoids  The patient tolerated the entire procedure well. Blood Loss minimal nominal    For biopsy  Specimen verification by physician and nurse two sources, name,           social security numbers     Colon preparation was good    Recommendations:     - Repeat colonoscopy in 5 years.       Copies sent to   Catalino Resendiz MD  CC:  Vicente Sanchez MD

## 2017-07-31 NOTE — PROGRESS NOTES
Neil Rubalcava  1965  [id number]    Situation:  Verbal report received from: Ronda Blanca RN  Procedure: Procedure(s) with comments:  COLONOSCOPY  ENDOSCOPIC POLYPECTOMY - polypectomy    Background:    Preoperative diagnosis: SCREENING  Postoperative diagnosis: Colon polyp, hemorrhoids    :  Dr. Michael Bean  Assistant(s): Endoscopy Technician-1: Aiden Argueta  Endoscopy RN-1: Thiago Carr RN    Specimens:   ID Type Source Tests Collected by Time Destination   1 : Path - Sigmoid colon polyp Preservative Sigmoid  Benigno Mackey MD 7/31/2017 4748 Pathology     H. Pylori  no    Assessment:  Intra-procedure medications :  Propofol 190 mg      Anesthesia gave intra-procedure sedation and medications, see anesthesia flow sheet yes    Intravenous fluids: NS@ KVO     Vital signs stable     Abdominal assessment: round and soft     Recommendation:  Discharge patient per MD order. Friend here. Permission to share finding with family or friend yes.

## 2017-07-31 NOTE — IP AVS SNAPSHOT
[address] [facility] 
[phone] Patient: Juan Miguel Madrid MRN: [id number] :1965 You are allergic to the following Allergen Reactions Nut Flavor Anaphylaxis Nuts (Tree Nut) Anaphylaxis Cafergot (Ergotamine-Caffeine) Nausea Only Recent Documentation Height Weight Breastfeeding? BMI OB Status Smoking Status 1.638 m 79.1 kg No 29.47 kg/m2 Having regular periods Never Smoker Emergency Contacts Name Discharge Info Relation Home Work Mobile Kane Dubon   [phone] Justino Becker  Child [2] [phone] About your hospitalization You were admitted on:  2017 You last received care in the:  [facility] ENDOSCOPY You were discharged on:  2017 Unit phone number:  [phone] Why you were hospitalized Your primary diagnosis was:  Not on File Providers Seen During Your Hospitalizations Provider Role Specialty Primary office phone Gray Gillis MD Attending Provider Internal Medicine [phone] Your Primary Care Physician (PCP) Primary Care Physician Office Phone Office Fax [address] [phone] Follow-up Information Follow up With Details Comments Contact Info Tasia Klein MD   [address] [place] [address] Avenue 
[phone] Current Discharge Medication List  
  
CONTINUE these medications which have NOT CHANGED Dose & Instructions Dispensing Information Comments Morning Noon Evening Bedtime  
 cetirizine 10 mg tablet Commonly known as:  ZYRTEC Your last dose was: Your next dose is:    
   
   
 Dose:  10 mg Take 1 Tab by mouth daily. Quantity:  30 Tab Refills:  12  
     
   
   
   
  
 clobetasol 0.05 % ointment Commonly known as:  David Dire Your last dose was: Your next dose is: Apply  to affected area two (2) times a day. Quantity:  45 g Refills:  12  
     
   
   
   
  
 fluticasone 50 mcg/actuation nasal spray Commonly known as:  Lila Brand Your last dose was: Your next dose is:    
   
   
 Dose:  2 Spray 2 Sprays by Both Nostrils route daily. Quantity:  1 Bottle Refills:  12 Discharge Instructions Endoscopy Discharge Instructions Dr. Nasim Franciscoton office [phone] Office    [phone] NAME: Everardo Donnelly RECORD [id number]:[id number] PATIENT SS#[id number] YOB: 1965 SEX:  female AGE:  49 y.o. FINAL Discharge Procedure and Diagnosis:   
  
Procedure(s): 
COLONOSCOPY FINDINGS:    
Polyp removed x 1 
hemorrhoids MEDICATIONS CONTINUE CURRENT MEDICATIONS 
 
  NEW MEDICATIONS 1.  
 2.  
 3.  
 
 
 
  
  
 
  
 
Testing Schedule Colonoscopy Screening                                   Recommendations Repeat colonoscopy in 3 years Repeat colonoscopy in 5 years Repeat colonoscopy in 10 years New additional 
Tests Call the office  
([phone]) for the appointment time YOUR NEXT APPOINTMENT WITH DR Shahla Stephen: in  4 week(s). Karli Clement MD  
                 
 
 
                     
                                     
   If you had a colonoscopy the \"C\" indicates specific instructions    
  
x  Ordinarily you may resume your previous diet but your initial diet should be   light. Large meals can cause abdominal discomfort after these procedures. Specific Diet Recommendations: High fiber diet.,  
 
     GERD diet: avoid fried and fatty foods, peppermint, chocolate, alcohol,    
          coffee, citrus fruits and juices, and tomato products. Avoid lying down for 2           to 3  hours after eating.       
            
__x__  You may feel quite tired and need to rest and recuperate for several hours    following these procedures. __x__  Due to the fact that sedation was administered for this procedure, do not drive,   operate machinery or sign legal documents for the next 24 hours. __x__  Mild abdominal pain may be experienced after your procedure, but is should   disappear after several hours. Notify your physician if you have persistent pain,   tenderness or abdominal distension. __x__  C Many patients for the first few hours following the exam may experience        belching or passing gas through the rectum. Walking may help to relieve      
 distention and gas pains. A warm bath or shower will often help with abdominal  cramping.                                                                                        
  
__x__   Lawrence Brandi may return to your normal routine tomorrow, according to how you feel        and depending on your doctors instructions. Be sure to call your doctor to make  an appointment for a post-surgery check-up on the date your doctor has   requested. __x__ C Rectal bleeding or spotting in small amounts may occur with the first bowel   movement following a colonoscopy or sigmoidoscopy. __x__  Lawrence Brandi may experience a numbness or lack of sensation in throat. If present, do not   
 eat or drink. Before eating, test your ability to drink with small sips of water. Y     You may try clear liquids or soups. If you tolerate these, you may then eat solid     food which is not greasy or spicy.  
  
__x__ C   
 IF POLYPS REMOVED: Avoid any blood thinning medication such as plavix,   aspirin or coumadin  NSAIDS (like advil or alleve) for 7 days. __x__  Notify your physician if you cough or vomit blood or experience chest pain. Your biopsy or testing result should be available in 7-10 days Prescription will be electronically sent to your pharmacy you must  
  let your nurse know your pharmacy:  
                                                                                                                               
 
 
     [facility] [address] TO HELP ENSURE A SMOOTH RECOVERY,  
    IT IS IMPORTANT TO FOLLOW THEM. _x___Pamphlet /Educational Information provided for diagnostic findings Additional education information can assessed at the sites below: 
 [place] 
 http://www.digestive. niddk.nih.gov/ddiseases/a-z.asp Web MD patient information Signature of individual giving instruction : 
 Date: 7/31/2017 Discharge Orders None General Information Please provide this summary of care documentation to your next provider. Patient Signature:  ____________________________________________________________ Date:  ____________________________________________________________  
  
Brian Finger Provider Signature:  ____________________________________________________________ Date:  ____________________________________________________________

## 2017-07-31 NOTE — DISCHARGE INSTRUCTIONS
Endoscopy Discharge Instructions     Dr. Janusz Garnica office   [facility]                                            NAME: Wilfred ARORAPO:[id number]   PATIENT SS#[id number] YOB: 1965   SEX:  female AGE:  49 y.o. FINAL Discharge Procedure and Diagnosis:       Procedure(s):  COLONOSCOPY       FINDINGS:     Polyp removed x 1  hemorrhoids                                            MEDICATIONS    [x] CONTINUE CURRENT MEDICATIONS     [] NEW MEDICATIONS           1.    2.    3.                      Testing   Schedule              Colonoscopy Screening                                   Recommendations   []  Repeat colonoscopy in 3 years   [x]  Repeat colonoscopy in 5 years   []  Repeat colonoscopy in 10 years      New additional  Tests  Call the office   ([phone]) for the appointment time      []      []      []                YOUR NEXT APPOINTMENT WITH DR Vazquez Mention: in  4 week(s). Swapnil Suero MD                                                                                        If you had a colonoscopy the \"C\" indicates specific instructions        x  Ordinarily you may resume your previous diet but your initial diet should be   light. Large meals can cause abdominal discomfort after these procedures. Specific Diet Recommendations:             [x] High fiber diet.,         [] GERD diet: avoid fried and fatty foods, peppermint, chocolate, alcohol,               coffee, citrus fruits and juices, and tomato products. Avoid lying down for 2           to 3  hours after eating.                     __x__  You may feel quite tired and need to rest and recuperate for several hours    following these procedures. __x__  Due to the fact that sedation was administered for this procedure, do not drive,   operate machinery or sign legal documents for the next 24 hours. __x__  Mild abdominal pain may be experienced after your procedure, but is should   disappear after several hours. Notify your physician if you have persistent pain,   tenderness or abdominal distension. __x__  C    Many patients for the first few hours following the exam may experience        belching or passing gas through the rectum. Walking may help to relieve        distention and gas pains. A warm bath or shower will often help with abdominal  cramping.                                                                                            __x__   Verlena Augustin may return to your normal routine tomorrow, according to how you feel        and depending on your doctors instructions. Be sure to call your doctor to make  an appointment for a post-surgery check-up on the date your doctor has   requested. __x__ C     Rectal bleeding or spotting in small amounts may occur with the first bowel   movement following a colonoscopy or sigmoidoscopy. __x__  Verlena Augustin may experience a numbness or lack of sensation in throat. If present, do not     eat or drink. Before eating, test your ability to drink with small sips of water. Y     You may try clear liquids or soups. If you tolerate these, you may then eat solid     food which is not greasy or spicy. __x__ C     IF POLYPS REMOVED: Avoid any blood thinning medication such as plavix,   aspirin or coumadin  NSAIDS (like advil or alleve) for 7 days. __x__  Notify your physician if you cough or vomit blood or experience chest pain.            Your biopsy or testing result should be available in 7-10 days Prescription will be electronically sent to your pharmacy you must     let your nurse know your pharmacy:                                                                                                                                            [id number] [place] [address]. TO HELP ENSURE A SMOOTH RECOVERY,       IT IS IMPORTANT TO FOLLOW THEM. _x___Pamphlet /Educational Information provided for diagnostic findings     Additional education information can assessed at the sites below:   [place]   http://www.digestive. niddk.nih.gov/ddiseases/a-z.asp      Web MD patient information                                                                                                Signature of individual giving instruction :   Date: 7/31/2017

## 2017-07-31 NOTE — ANESTHESIA POSTPROCEDURE EVALUATION
Post-Anesthesia Evaluation and Assessment    Patient: Venita Costa MRN: [id number]  SSN: [id number]    YOB: 1965  Age: 49 y.o. Sex: female       Cardiovascular Function/Vital Signs  Visit Vitals    /68    Pulse (!) 44    Temp 36.6 °C (97.8 °F)    Resp 22    Ht 5' 4.5\" (1.638 m)    Wt 79.1 kg (174 lb 6 oz)    SpO2 98%    Breastfeeding No    BMI 29.47 kg/m2       Patient is status post total IV anesthesia anesthesia for Procedure(s):  COLONOSCOPY  ENDOSCOPIC POLYPECTOMY. Nausea/Vomiting: None    Postoperative hydration reviewed and adequate. Pain:  Pain Scale 1: Visual (07/31/17 0935)  Pain Intensity 1: 0 (07/31/17 0935)   Managed    Neurological Status: At baseline    Mental Status and Level of Consciousness: Arousable    Pulmonary Status:   O2 Device: Room air (07/31/17 0935)   Adequate oxygenation and airway patent    Complications related to anesthesia: None    Post-anesthesia assessment completed.  No concerns    Signed By: Leno Mustafa MD     July 31, 2017

## 2017-07-31 NOTE — ANESTHESIA PREPROCEDURE EVALUATION
Anesthetic History   No history of anesthetic complications            Review of Systems / Medical History  Patient summary reviewed, nursing notes reviewed and pertinent labs reviewed    Pulmonary  Within defined limits                 Neuro/Psych   Within defined limits           Cardiovascular                  Exercise tolerance: >4 METS  Comments:  Hx bradycardia   GI/Hepatic/Renal  Within defined limits              Endo/Other        Obesity     Other Findings   Comments: Allergies         Physical Exam    Airway  Mallampati: I    Neck ROM: normal range of motion   Mouth opening: Normal     Cardiovascular  Regular rate and rhythm,  S1 and S2 normal,  no murmur, click, rub, or gallop             Dental  No notable dental hx       Pulmonary  Breath sounds clear to auscultation               Abdominal  GI exam deferred       Other Findings            Anesthetic Plan    ASA: 2  Anesthesia type: total IV anesthesia          Induction: Intravenous  Anesthetic plan and risks discussed with: Patient

## 2017-11-13 ENCOUNTER — OFFICE VISIT (OUTPATIENT)
Dept: INTERNAL MEDICINE CLINIC | Age: 52
End: 2017-11-13

## 2017-11-13 VITALS
SYSTOLIC BLOOD PRESSURE: 136 MMHG | HEIGHT: 64 IN | OXYGEN SATURATION: 98 % | DIASTOLIC BLOOD PRESSURE: 80 MMHG | HEART RATE: 61 BPM | RESPIRATION RATE: 16 BRPM | WEIGHT: 174 LBS | TEMPERATURE: 98.1 F | BODY MASS INDEX: 29.71 KG/M2

## 2017-11-13 DIAGNOSIS — R07.9 CHEST PAIN, UNSPECIFIED TYPE: Primary | ICD-10-CM

## 2017-11-13 DIAGNOSIS — K21.00 GASTROESOPHAGEAL REFLUX DISEASE WITH ESOPHAGITIS: ICD-10-CM

## 2017-11-13 RX ORDER — ESOMEPRAZOLE MAGNESIUM 40 MG/1
40 CAPSULE, DELAYED RELEASE ORAL DAILY
Qty: 30 CAP | Refills: 12 | Status: SHIPPED | OUTPATIENT
Start: 2017-11-13 | End: 2021-04-03

## 2017-11-13 NOTE — PROGRESS NOTES
Kari Kearns is a 49 y.o. female and presents with Chest Pain    Subjective:  Chest Wall Pain   Patient presents for presents evaluation of chest wall pain. Onset was 2 days ago. Pain description: character of chest pain: burning, cramping Mechanism of injury: may occur while at rest.Previous visits for this problem: none. Evaluation to date: EKG: several months ago: abnormal:  Treatment to date: none  She has some pains on deep inspiration,she feels much improved. GERD Review:   Patient has a history of gastroesophageal reflux with heartburn. Symptoms have been present for a few days. She denies dysphagia. She  has not lost weight. She denies melena, hematochezia, hematemesis, and coffee ground emesis. She denies abdominal bloating and none. Review of Systems  Constitutional: negative for fevers, chills, anorexia and weight loss  Eyes:   negative for visual disturbance and irritation  ENT:   negative for tinnitus,sore throat,nasal congestion,ear pains. hoarseness  Respiratory:  negative for cough, hemoptysis, dyspnea,wheezing  CV:   chest pain,  GI:   negative for nausea, vomiting, diarrhea, abdominal pain,melena  Endo:               negative for polyuria,polydipsia,polyphagia,heat intolerance  Genitourinary: negative for frequency, dysuria and hematuria  Integument:  negative for rash and pruritus  Hematologic:  negative for easy bruising and gum/nose bleeding  Musculoskel: negative for myalgias, arthralgias, back pain, muscle weakness, joint pain  Neurological:  negative for headaches, dizziness, vertigo, memory problems and gait   Behavl/Psych: negative for feelings of anxiety, depression, mood changes    No past medical history on file.   Past Surgical History:   Procedure Laterality Date    COLONOSCOPY N/A 2017    COLONOSCOPY performed by Juan Luis Perez MD at [facility] ENDOSCOPY    HX  SECTION      HX GYN Right     lumpectomy    HX OOPHORECTOMY      partial      Social History Social History    Marital status: SINGLE     Spouse name: N/A    Number of children: N/A    Years of education: N/A     Social History Main Topics    Smoking status: Never Smoker    Smokeless tobacco: Never Used    Alcohol use No    Drug use: Not on file    Sexual activity: Not on file     Other Topics Concern    Not on file     Social History Narrative     Family History   Problem Relation Age of Onset    Heart Disease Father      Current Outpatient Prescriptions   Medication Sig Dispense Refill    fluticasone (FLONASE) 50 mcg/actuation nasal spray 2 Sprays by Both Nostrils route daily. 1 Bottle 12    cetirizine (ZYRTEC) 10 mg tablet Take 1 Tab by mouth daily. 30 Tab 12    clobetasol (TEMOVATE) 0.05 % ointment Apply  to affected area two (2) times a day. 45 g 12     Allergies   Allergen Reactions    Nut Flavor Anaphylaxis    Nuts [Tree Nut] Anaphylaxis    Cafergot [Ergotamine-Caffeine] Nausea Only       Objective:  Visit Vitals    /80    Pulse 61    Temp 98.1 °F (36.7 °C) (Oral)    Resp 16    Ht 5' 4\" (1.626 m)    Wt 174 lb (78.9 kg)    SpO2 98%    BMI 29.87 kg/m2     Physical Exam:   General appearance - alert, well appearing, and in no distress  Mental status - alert, oriented to person, place, and time  EYE-MIRA, EOMI, corneas normal, no foreign bodies  ENT-ENT exam normal, no neck nodes or sinus tenderness  Nose - normal and patent, no erythema, discharge or polyps  Mouth - mucous membranes moist, pharynx normal without lesions  Neck - supple, no significant adenopathy   Chest - clear to auscultation, no wheezes, rales or rhonchi, symmetric air entry   Heart - normal rate, regular rhythm, normal S1, S2, no murmurs, rubs, clicks or gallops   Abdomen - soft, nontender, nondistended, no masses or organomegaly  Lymph- no adenopathy palpable  Ext-peripheral pulses normal, no pedal edema, no clubbing or cyanosis  Skin-Warm and dry.  no hyperpigmentation, vitiligo, or suspicious lesions  Neuro -alert, oriented, normal speech, no focal findings or movement disorder noted  Neck-normal C-spine, no tenderness, full ROM without pain  Feet-no nail deformities or callus formation with good pulses noted      Results for orders placed or performed during the hospital encounter of 07/31/17   HCG URINE, QL. - POC   Result Value Ref Range    Pregnancy test,urine (POC) NEGATIVE  NEG         Assessment/Plan:    ICD-10-CM ICD-9-CM    1. Chest pain, unspecified type R07.9 786.50 AMB POC EKG ROUTINE W/ 12 LEADS, INTER & REP     Orders Placed This Encounter    AMB POC EKG ROUTINE W/ 12 LEADS, INTER & REP     Order Specific Question:   Reason for Exam:     Answer:   chest pain     lose weight, follow low fat diet, follow low salt diet,Take 81mg aspirin daily  There are no Patient Instructions on file for this visit. Follow-up Disposition: Not on File      I have reviewed with the patient details of the assessment and plan and all questions were answered. Relevent patient education was performed. The most recent lab findings were reviewed with the patient. An After Visit Summary was printed and given to the patient.

## 2017-11-13 NOTE — PATIENT INSTRUCTIONS
[employer] Activation    Thank you for requesting access to [facility]. Please follow the instructions below to securely access and download your online medical record. [facility] allows you to send messages to your doctor, view your test results, renew your prescriptions, schedule appointments, and more. How Do I Sign Up? 1. In your internet browser, go to [email or web address]  2. Click on the First Time User? Click Here link in the Sign In box. You will be redirect to the New Member Sign Up page. 3. Enter your [facility] Access Code exactly as it appears below. You will not need to use this code after youve completed the sign-up process. If you do not sign up before the expiration date, you must request a new code. [facility] Access Code: Activation code not generated  Current [facility] Status: Patient Declined (This is the date your [facility] access code will )    4. Enter the last four digits of your Social Security Number (xxxx) and Date of Birth (mm/dd/yyyy) as indicated and click Submit. You will be taken to the next sign-up page. 5. Create a [facility] ID. This will be your [facility] login ID and cannot be changed, so think of one that is secure and easy to remember. 6. Create a [facility] password. You can change your password at any time. 7. Enter your Password Reset Question and Answer. This can be used at a later time if you forget your password. 8. Enter your e-mail address. You will receive e-mail notification when new information is available in [address] 9. Click Sign Up. You can now view and download portions of your medical record. 10. Click the Download Summary menu link to download a portable copy of your medical information. Additional Information    If you have questions, please visit the Frequently Asked Questions section of the [facility] website at [email or web address] Remember, [facility] is NOT to be used for urgent needs. For medical emergencies, dial 911.

## 2017-11-13 NOTE — MR AVS SNAPSHOT
Visit Information Date & Time Provider Department Dept. Phone Encounter #  
 11/13/2017  1:30 PM Shawna Chow MD [address] [facility] [phone] [id number] Follow-up Instructions Return in about 3 months (around 2/13/2018), or if symptoms worsen or fail to improve. Follow-up and Disposition History Upcoming Health Maintenance Date Due DTaP/Tdap/Td series (1 - Tdap) 12/14/1987 BREAST CANCER SCRN MAMMOGRAM 12/14/2016 FOBT Q 1 YEAR AGE 50-75 12/14/2016 Influenza Age 5 to Adult 8/1/2017 PAP AKA CERVICAL CYTOLOGY 5/12/2019 Allergies as of 11/13/2017  Review Complete On: 7/31/2017 By: Lewis Mckenna Severity Noted Reaction Type Reactions Nut Flavor High 11/19/2012    Anaphylaxis Nuts [Tree Nut] High 07/19/2017    Anaphylaxis Cafergot [Ergotamine-caffeine]  11/19/2012    Nausea Only Current Immunizations  Never Reviewed No immunizations on file. Not reviewed this visit You Were Diagnosed With   
  
 Codes Comments Chest pain, unspecified type    -  Primary ICD-10-CM: R07.9 ICD-9-CM: 786.50 Gastroesophageal reflux disease with esophagitis     ICD-10-CM: K21.0 ICD-9-CM: 530.11 Vitals BP Pulse Temp Resp Height(growth percentile) Weight(growth percentile) 136/80 61 98.1 °F (36.7 °C) (Oral) 16 5' 4\" (1.626 m) 174 lb (78.9 kg) SpO2 BMI OB Status Smoking Status 98% 29.87 kg/m2 Having regular periods Never Smoker BMI and BSA Data Body Mass Index Body Surface Area  
 29.87 kg/m 2 1.89 m 2 Preferred Pharmacy Pharmacy Name Phone [facility] [facility] 93 Your Updated Medication List  
  
   
This list is accurate as of: 11/13/17  2:57 PM.  Always use your most recent med list.  
  
  
  
  
 cetirizine 10 mg tablet Commonly known as:  ZYRTEC Take 1 Tab by mouth daily. clobetasol 0.05 % ointment Commonly known as:  Wanda Presley Apply  to affected area two (2) times a day. esomeprazole 40 mg capsule Commonly known as:  NexIUM Take 1 Cap by mouth daily. fluticasone 50 mcg/actuation nasal spray Commonly known as:  Carolina Page 2 Sprays by Both Nostrils route daily. Prescriptions Sent to Pharmacy Refills  
 esomeprazole (NEXIUM) 40 mg capsule 12 Sig: Take 1 Cap by mouth daily. Class: Normal  
 Pharmacy: [address] [place] [address], [address] [place] [address] #: [phone] Route: Oral  
  
We Performed the Following AMB POC EKG ROUTINE  LEADS, INTER & REP [82377 CPT(R)] Follow-up Instructions Return in about 3 months (around 2018), or if symptoms worsen or fail to improve. Patient Instructions eÃ‡ift Activation Thank you for requesting access to eÃ‡ift. Please follow the instructions below to securely access and download your online medical record. eÃ‡ift allows you to send messages to your doctor, view your test results, renew your prescriptions, schedule appointments, and more. How Do I Sign Up? 1. In your internet browser, go to [email or web address]. 
2. Click on the First Time User? Click Here link in the Sign In box. You will be redirect to the New Member Sign Up page. 3. Enter your eÃ‡ift Access Code exactly as it appears below. You will not need to use this code after youve completed the sign-up process. If you do not sign up before the expiration date, you must request a new code. eÃ‡ift Access Code: Activation code not generated Current eÃ‡ift Status: Patient Declined (This is the date your eÃ‡ift access code will ) 4. Enter the last four digits of your Social Security Number (xxxx) and Date of Birth (mm/dd/yyyy) as indicated and click Submit. You will be taken to the next sign-up page. 5. Create a Billtrustt ID. This will be your Budding Biologist login ID and cannot be changed, so think of one that is secure and easy to remember. 6. Create a Budding Biologist password. You can change your password at any time. 7. Enter your Password Reset Question and Answer. This can be used at a later time if you forget your password. 8. Enter your e-mail address. You will receive e-mail notification when new information is available in [address] 9. Click Sign Up. You can now view and download portions of your medical record. 10. Click the Download Summary menu link to download a portable copy of your medical information. Additional Information If you have questions, please visit the Frequently Asked Questions section of the Budding Biologist website at [email or web address] Remember, Budding Biologist is NOT to be used for urgent needs. For medical emergencies, dial 911. Please provide this summary of care documentation to your next provider. Your primary care clinician is listed as Preston Mendoza. If you have any questions after today's visit, please call [phone].

## 2019-07-10 ENCOUNTER — OFFICE VISIT (OUTPATIENT)
Dept: INTERNAL MEDICINE CLINIC | Age: 54
End: 2019-07-10

## 2019-07-10 VITALS
WEIGHT: 176 LBS | HEART RATE: 58 BPM | OXYGEN SATURATION: 99 % | BODY MASS INDEX: 30.05 KG/M2 | RESPIRATION RATE: 16 BRPM | SYSTOLIC BLOOD PRESSURE: 138 MMHG | TEMPERATURE: 98.3 F | HEIGHT: 64 IN | DIASTOLIC BLOOD PRESSURE: 86 MMHG

## 2019-07-10 DIAGNOSIS — Z91.018 NUT ALLERGY: ICD-10-CM

## 2019-07-10 DIAGNOSIS — M19.049 HAND ARTHRITIS: ICD-10-CM

## 2019-07-10 DIAGNOSIS — I10 LABILE ESSENTIAL HYPERTENSION: Primary | ICD-10-CM

## 2019-07-10 RX ORDER — EPINEPHRINE 0.15 MG/.3ML
0.15 INJECTION INTRAMUSCULAR
Qty: 1 SYRINGE | Refills: 12 | Status: SHIPPED | OUTPATIENT
Start: 2019-07-10 | End: 2019-07-10

## 2019-07-10 NOTE — PATIENT INSTRUCTIONS
Indigoz Activation Thank you for requesting access to Indigoz. Please follow the instructions below to securely access and download your online medical record. Indigoz allows you to send messages to your doctor, view your test results, renew your prescriptions, schedule appointments, and more. How Do I Sign Up? 1. In your internet browser, go to [email or web address] 
2. Click on the First Time User? Click Here link in the Sign In box. You will be redirect to the New Member Sign Up page. 3. Enter your Indigoz Access Code exactly as it appears below. You will not need to use this code after youve completed the sign-up process. If you do not sign up before the expiration date, you must request a new code. Indigoz Access Code: [email or web address] Expires: 2019 10:07 AM (This is the date your Indigoz access code will ) 4. Enter the last four digits of your Social Security Number (xxxx) and Date of Birth (mm/dd/yyyy) as indicated and click Submit. You will be taken to the next sign-up page. 5. Create a Indigoz ID. This will be your Indigoz login ID and cannot be changed, so think of one that is secure and easy to remember. 6. Create a Indigoz password. You can change your password at any time. 7. Enter your Password Reset Question and Answer. This can be used at a later time if you forget your password. 8. Enter your e-mail address. You will receive e-mail notification when new information is available in [address] 9. Click Sign Up. You can now view and download portions of your medical record. 10. Click the Download Summary menu link to download a portable copy of your medical information. Additional Information If you have questions, please visit the Frequently Asked Questions section of the Indigoz website at [email or web address] [email or web address] Cubicle/Mimi Hearing [employer] GmbHhart/. Remember, Indigoz is NOT to be used for urgent needs. For medical emergencies, dial 911.

## 2019-07-10 NOTE — PROGRESS NOTES
1. Have you been to the ER, urgent care clinic since your last visit? Hospitalized since your last visit?no    2. Have you seen or consulted any other health care providers outside of the [address] [place] [address] since your last visit? Include any pap smears or colon screening. No    3 most recent PHQ Screens 7/19/2017   PHQ Not Done Patient Decline   Little interest or pleasure in doing things Not at all   Feeling down, depressed, irritable, or hopeless Not at all   Total Score PHQ 2 0     Chief Complaint   Patient presents with    Hand Pain     bilateral     Per Dr. Marcy Jarquin.,  verbal order given for needed amb poc labs.

## 2019-07-10 NOTE — PROGRESS NOTES
Medford Jeans is a 47 y.o. female and presents with Hand Pain (bilateral)    Subjective:  Hypertension Review:  The patient has essential hypertension  Diet and Lifestyle: generally follows a  low sodium diet, exercises sporadically  Home BP Monitoring: is not measured at home. Pertinent ROS: taking medications as instructed, no medication side effects noted, no TIA's, no chest pain on exertion, no dyspnea on exertion, no swelling of ankles. Hand Pain Review:  Patient presents for evaluation of arthralgia for a few month(s). Pain is located in bilaterally  hand, is described as intermittent   . Associated symptoms include: none. The patient has tried minocycline for relief with minimal relief. Related to injury:   no.nothing specific    She states she is allergic to tree nuts      Review of Systems  Constitutional: negative for fevers, chills, anorexia and weight loss  Eyes:   negative for visual disturbance and irritation  ENT:   negative for tinnitus,sore throat,nasal congestion,ear pains. hoarseness  Respiratory:  negative for cough, hemoptysis, dyspnea,wheezing  CV:   negative for chest pain, palpitations, lower extremity edema  GI:   negative for nausea, vomiting, diarrhea, abdominal pain,melena  Endo:               negative for polyuria,polydipsia,polyphagia,heat intolerance  Genitourinary: negative for frequency, dysuria and hematuria  Integument:  negative for rash and pruritus  Hematologic:  negative for easy bruising and gum/nose bleeding  Musculoskel: negative for myalgias, arthralgias, back pain, muscle weakness, joint pain  Neurological:  negative for headaches, dizziness, vertigo, memory problems and gait   Behavl/Psych: negative for feelings of anxiety, depression, mood changes  Both hands-normal range of motion noted. History reviewed. No pertinent past medical history.   Past Surgical History:   Procedure Laterality Date    COLONOSCOPY N/A 7/31/2017    COL[facility] Randal Tucker MD at [facility] ENDOSCOPY    HX  SECTION      HX GYN Right     lumpectomy    HX OOPHORECTOMY      partial      Social History     Socioeconomic History    Marital status: SINGLE     Spouse name: Not on file    Number of children: Not on file    Years of education: Not on file    Highest education level: Not on file   Tobacco Use    Smoking status: Never Smoker    Smokeless tobacco: Never Used   Substance and Sexual Activity    Alcohol use: No     Family History   Problem Relation Age of Onset    Heart Disease Father      Current Outpatient Medications   Medication Sig Dispense Refill    esomeprazole (NEXIUM) 40 mg capsule Take 1 Cap by mouth daily. (Patient not taking: Reported on 7/10/2019) 30 Cap 12    fluticasone (FLONASE) 50 mcg/actuation nasal spray 2 Sprays by Both Nostrils route daily. (Patient not taking: Reported on 7/10/2019) 1 Bottle 12    cetirizine (ZYRTEC) 10 mg tablet Take 1 Tab by mouth daily. (Patient not taking: Reported on 7/10/2019) 30 Tab 12    clobetasol (TEMOVATE) 0.05 % ointment Apply  to affected area two (2) times a day.  45 g 12     Allergies   Allergen Reactions    Nut Flavor Anaphylaxis    Nuts [Tree Nut] Anaphylaxis    Cafergot [Ergotamine-Caffeine] Nausea Only       Objective:  Visit Vitals  /86   Pulse (!) 58   Temp 98.3 °F (36.8 °C) (Oral)   Resp 16   Ht 5' 4\" (1.626 m)   Wt 176 lb (79.8 kg)   SpO2 99%   BMI 30.21 kg/m²     Physical Exam:   General appearance - alert, well appearing, and in no distress  Mental status - alert, oriented to person, place, and time  EYE-MIRA, EOMI, corneas normal, no foreign bodies  ENT-ENT exam normal, no neck nodes or sinus tenderness  Nose - normal and patent, no erythema, discharge or polyps  Mouth - mucous membranes moist, pharynx normal without lesions  Neck - supple, no significant adenopathy   Chest - clear to auscultation, no wheezes, rales or rhonchi, symmetric air entry   Heart - normal rate, regular rhythm, normal S1, S2, no murmurs, rubs, clicks or gallops   Abdomen - soft, nontender, nondistended, no masses or organomegaly  Lymph- no adenopathy palpable  Ext-peripheral pulses normal, no pedal edema, no clubbing or cyanosis  Skin-Warm and dry. no hyperpigmentation, vitiligo, or suspicious lesions  Neuro -alert, oriented, normal speech, no focal findings or movement disorder noted  Neck-normal C-spine, no tenderness, full ROM without pain  Feet-no nail deformities or callus formation with good pulses noted      Results for orders placed or performed during the hospital encounter of 17   HCG URINE, QL. - POC   Result Value Ref Range    Pregnancy test,urine (POC) NEGATIVE  NEG         Assessment/Plan:  No diagnosis found. No orders of the defined types were placed in this encounter. lose weight, follow low fat diet, follow low salt diet, continue present plan,Take 81mg aspirin daily  Patient Instructions   Agile HealthharDriftToIt Activation    Thank you for requesting access to Apozy. Please follow the instructions below to securely access and download your online medical record. Apozy allows you to send messages to your doctor, view your test results, renew your prescriptions, schedule appointments, and more. How Do I Sign Up? 1. In your internet browser, go to [email or web address]  2. Click on the First Time User? Click Here link in the Sign In box. You will be redirect to the New Member Sign Up page. 3. Enter your Apozy Access Code exactly as it appears below. You will not need to use this code after youve completed the sign-up process. If you do not sign up before the expiration date, you must request a new code. Apozy Access Code: [email or web address]  Expires: 2019 10:07 AM (This is the date your Apozy access code will )    4. Enter the last four digits of your Social Security Number (xxxx) and Date of Birth (mm/dd/yyyy) as indicated and click Submit.  You will be taken to the next sign-up page.  5. Create a Vengo Labst ID. This will be your Rachio login ID and cannot be changed, so think of one that is secure and easy to remember. 6. Create a Rachio password. You can change your password at any time. 7. Enter your Password Reset Question and Answer. This can be used at a later time if you forget your password. 8. Enter your e-mail address. You will receive e-mail notification when new information is available in [address] 9. Click Sign Up. You can now view and download portions of your medical record. 10. Click the Download Summary menu link to download a portable copy of your medical information. Additional Information    If you have questions, please visit the Frequently Asked Questions section of the Rachio website at https://[employer] Enjoyor/Jordan Training Technology Groupt/. Remember, Rachio is NOT to be used for urgent needs. For medical emergencies, dial 911. Follow-up and Dispositions    · Return in about 1 month (around 8/7/2019), or if symptoms worsen or fail to improve. I have reviewed with the patient details of the assessment and plan and all questions were answered. Relevent patient education was performed. The most recent lab findings were reviewed with the patient. An After Visit Summary was printed and given to the patient.

## 2019-07-12 LAB
ALBUMIN SERPL-MCNC: 4.7 G/DL (ref 3.5–5.5)
ALBUMIN/GLOB SERPL: 1.4 {RATIO} (ref 1.2–2.2)
ALP SERPL-CCNC: 61 IU/L (ref 39–117)
ALT SERPL-CCNC: 15 IU/L (ref 0–32)
AST SERPL-CCNC: 20 IU/L (ref 0–40)
B BURGDOR IGG+IGM SER-ACNC: <0.91 ISR (ref 0–0.9)
BILIRUB SERPL-MCNC: 0.5 MG/DL (ref 0–1.2)
BUN SERPL-MCNC: 11 MG/DL (ref 6–24)
BUN/CREAT SERPL: 13 (ref 9–23)
CALCIUM SERPL-MCNC: 9.9 MG/DL (ref 8.7–10.2)
CHLORIDE SERPL-SCNC: 100 MMOL/L (ref 96–106)
CO2 SERPL-SCNC: 22 MMOL/L (ref 20–29)
CREAT SERPL-MCNC: 0.82 MG/DL (ref 0.57–1)
ERYTHROCYTE [DISTWIDTH] IN BLOOD BY AUTOMATED COUNT: 14.9 % (ref 12.3–15.4)
ERYTHROCYTE [SEDIMENTATION RATE] IN BLOOD BY WESTERGREN METHOD: 35 MM/HR (ref 0–40)
GLOBULIN SER CALC-MCNC: 3.4 G/DL (ref 1.5–4.5)
GLUCOSE SERPL-MCNC: 83 MG/DL (ref 65–99)
HCT VFR BLD AUTO: 43.9 % (ref 34–46.6)
HGB BLD-MCNC: 14 G/DL (ref 11.1–15.9)
MCH RBC QN AUTO: 26.7 PG (ref 26.6–33)
MCHC RBC AUTO-ENTMCNC: 31.9 G/DL (ref 31.5–35.7)
MCV RBC AUTO: 84 FL (ref 79–97)
PLATELET # BLD AUTO: 252 X10E3/UL (ref 150–450)
POTASSIUM SERPL-SCNC: 4.5 MMOL/L (ref 3.5–5.2)
PROT SERPL-MCNC: 8.1 G/DL (ref 6–8.5)
RBC # BLD AUTO: 5.24 X10E6/UL (ref 3.77–5.28)
RHEUMATOID FACT SERPL-ACNC: <10 IU/ML (ref 0–13.9)
SODIUM SERPL-SCNC: 140 MMOL/L (ref 134–144)
WBC # BLD AUTO: 5.5 X10E3/UL (ref 3.4–10.8)

## 2019-08-23 ENCOUNTER — OFFICE VISIT (OUTPATIENT)
Dept: INTERNAL MEDICINE CLINIC | Age: 54
End: 2019-08-23

## 2019-08-23 VITALS
TEMPERATURE: 98.5 F | HEIGHT: 64 IN | WEIGHT: 176 LBS | HEART RATE: 71 BPM | SYSTOLIC BLOOD PRESSURE: 114 MMHG | OXYGEN SATURATION: 98 % | BODY MASS INDEX: 30.05 KG/M2 | DIASTOLIC BLOOD PRESSURE: 84 MMHG | RESPIRATION RATE: 16 BRPM

## 2019-08-23 DIAGNOSIS — G56.01 CARPAL TUNNEL SYNDROME OF RIGHT WRIST: Primary | ICD-10-CM

## 2019-08-23 NOTE — PROGRESS NOTES
1. Have you been to the ER, urgent care clinic since your last visit? Hospitalized since your last visit?no    2. Have you seen or consulted any other health care providers outside of the [address] [place] [address] since your last visit? Include any pap smears or colon screening.  No    3 most recent PHQ Screens 7/10/2019   PHQ Not Done -   Little interest or pleasure in doing things Not at all   Feeling down, depressed, irritable, or hopeless Not at all   Total Score PHQ 2 0     Chief Complaint   Patient presents with   Elena Arriaza [address] Reminder    Joint Pain     hand/f/u

## 2019-08-23 NOTE — PROGRESS NOTES
Harvinder Bal is a 47 y.o. female and presents with Mammogram Reminder and Joint Pain (hand/f/u)  . Subjective:  Carpal Tunnel Syndrome Review:  Patient presents for evaluation of pain in hand(s), hand paresthesias. Onset of the symptoms was several months ago. Current symptoms include tingling involving the medial aspect of the bilateral hand(s): severity: moderate. Patient's overall course: gradually worsening. Patient has had no prior elbow problems. Previous visits for this problem: none. Evaluation to date: none. Treatment to date: none. Review of Systems  Constitutional: negative for fevers, chills, anorexia and weight loss  Eyes:   negative for visual disturbance and irritation  ENT:   negative for tinnitus,sore throat,nasal congestion,ear pains. hoarseness  Respiratory:  negative for cough, hemoptysis, dyspnea,wheezing  CV:   negative for chest pain, palpitations, lower extremity edema  GI:   negative for nausea, vomiting, diarrhea, abdominal pain,melena  Endo:               negative for polyuria,polydipsia,polyphagia,heat intolerance  Genitourinary: negative for frequency, dysuria and hematuria  Integument:  negative for rash and pruritus  Hematologic:  negative for easy bruising and gum/nose bleeding  Musculoskel: negative for myalgias, arthralgias, back pain, muscle weakness, joint pain  Neurological:  negative for headaches, dizziness, vertigo, memory problems and gait   Behavl/Psych: negative for feelings of anxiety, depression, mood changes    History reviewed. No pertinent past medical history.   Past Surgical History:   Procedure Laterality Date    COLONOSCOPY N/A 2017    COLONOSCOPY performed by Des Viramontes MD at [facility]"" ENDOSCOPY    HX  SECTION      HX GYN Right     lumpectomy    HX OOPHORECTOMY      partial      Social History     Socioeconomic History    Marital status: SINGLE     Spouse name: Not on file    Number of children: Not on file    Years of education: Not on file    Highest education level: Not on file   Tobacco Use    Smoking status: Never Smoker    Smokeless tobacco: Never Used   Substance and Sexual Activity    Alcohol use: No     Family History   Problem Relation Age of Onset    Heart Disease Father      Current Outpatient Medications   Medication Sig Dispense Refill    esomeprazole (NEXIUM) 40 mg capsule Take 1 Cap by mouth daily. (Patient not taking: Reported on 8/23/2019) 30 Cap 12    fluticasone (FLONASE) 50 mcg/actuation nasal spray 2 Sprays by Both Nostrils route daily. (Patient not taking: Reported on 8/23/2019) 1 Bottle 12    cetirizine (ZYRTEC) 10 mg tablet Take 1 Tab by mouth daily. (Patient not taking: Reported on 8/23/2019) 30 Tab 12    clobetasol (TEMOVATE) 0.05 % ointment Apply  to affected area two (2) times a day. 45 g 12     Allergies   Allergen Reactions    Nut Flavor Anaphylaxis    Nuts [Tree Nut] Anaphylaxis    Cafergot [Ergotamine-Caffeine] Nausea Only       Objective:  Visit Vitals  /84   Pulse 71   Temp 98.5 °F (36.9 °C) (Oral)   Resp 16   Ht 5' 4\" (1.626 m)   Wt 176 lb (79.8 kg)   SpO2 98%   BMI 30.21 kg/m²     Physical Exam:   General appearance - alert, well appearing, and in no distress  Mental status - alert, oriented to person, place, and time  EYE-MIRA, EOMI, corneas normal, no foreign bodies  ENT-ENT exam normal, no neck nodes or sinus tenderness  Nose - normal and patent, no erythema, discharge or polyps  Mouth - mucous membranes moist, pharynx normal without lesions  Neck - supple, no significant adenopathy   Chest - clear to auscultation, no wheezes, rales or rhonchi, symmetric air entry   Heart - normal rate, regular rhythm, normal S1, S2, no murmurs, rubs, clicks or gallops   Abdomen - soft, nontender, nondistended, no masses or organomegaly  Lymph- no adenopathy palpable  Ext-peripheral pulses normal, no pedal edema, no clubbing or cyanosis  Skin-Warm and dry.  no hyperpigmentation, vitiligo, or suspicious lesions  Neuro -alert, oriented, normal speech, no focal findings or movement disorder noted  Neck-normal C-spine, no tenderness, full ROM without pain  Feet-no nail deformities or callus formation with good pulses noted      Results for orders placed or performed in visit on 84/03/74   METABOLIC PANEL, COMPREHENSIVE   Result Value Ref Range    Glucose 83 65 - 99 mg/dL    BUN 11 6 - 24 mg/dL    Creatinine 0.82 0.57 - 1.00 mg/dL    GFR est non-AA 83 >59 mL/min/1.73    GFR est AA 95 >59 mL/min/1.73    BUN/Creatinine ratio 13 9 - 23    Sodium 140 134 - 144 mmol/L    Potassium 4.5 3.5 - 5.2 mmol/L    Chloride 100 96 - 106 mmol/L    CO2 22 20 - 29 mmol/L    Calcium 9.9 8.7 - 10.2 mg/dL    Protein, total 8.1 6.0 - 8.5 g/dL    Albumin 4.7 3.5 - 5.5 g/dL    GLOBULIN, TOTAL 3.4 1.5 - 4.5 g/dL    A-G Ratio 1.4 1.2 - 2.2    Bilirubin, total 0.5 0.0 - 1.2 mg/dL    Alk. phosphatase 61 39 - 117 IU/L    AST (SGOT) 20 0 - 40 IU/L    ALT (SGPT) 15 0 - 32 IU/L   CBC W/O DIFF   Result Value Ref Range    WBC 5.5 3.4 - 10.8 x10E3/uL    RBC 5.24 3.77 - 5.28 x10E6/uL    HGB 14.0 11.1 - 15.9 g/dL    HCT 43.9 34.0 - 46.6 %    MCV 84 79 - 97 fL    MCH 26.7 26.6 - 33.0 pg    MCHC 31.9 31.5 - 35.7 g/dL    RDW 14.9 12.3 - 15.4 %    PLATELET 058 194 - 646 x10E3/uL   RHEUMATOID FACTOR, QL   Result Value Ref Range    Rheumatoid factor <10.0 0.0 - 13.9 IU/mL   LYME AB TOTAL W/RFLX W BLOT   Result Value Ref Range    Lyme Ab, IgG/IgM <0.91 0.00 - 0.90 ISR   SED RATE (ESR)   Result Value Ref Range    Sed rate (ESR) 35 0 - 40 mm/hr       Assessment/Plan:    ICD-10-CM ICD-9-CM    1. Carpal tunnel syndrome of right wrist G56.01 354.0      No orders of the defined types were placed in this encounter. follow low salt diet, routine labs ordered, call if any problems,Take 81mg aspirin daily  Patient Instructions   MyChart Activation    Thank you for requesting access to Meal Sharing.  Please follow the instructions below to securely access and download your online medical record. ALOSKO allows you to send messages to your doctor, view your test results, renew your prescriptions, schedule appointments, and more. How Do I Sign Up? 1. In your internet browser, go to [email or web address]  2. Click on the First Time User? Click Here link in the Sign In box. You will be redirect to the New Member Sign Up page. 3. Enter your ALOSKO Access Code exactly as it appears below. You will not need to use this code after youve completed the sign-up process. If you do not sign up before the expiration date, you must request a new code. ALOSKO Access Code: [email or web address]  Expires: 2019 10:07 AM (This is the date your ALOSKO access code will )    4. Enter the last four digits of your Social Security Number (xxxx) and Date of Birth (mm/dd/yyyy) as indicated and click Submit. You will be taken to the next sign-up page. 5. Create a ALOSKO ID. This will be your ALOSKO login ID and cannot be changed, so think of one that is secure and easy to remember. 6. Create a ALOSKO password. You can change your password at any time. 7. Enter your Password Reset Question and Answer. This can be used at a later time if you forget your password. 8. Enter your e-mail address. You will receive e-mail notification when new information is available in [address] 9. Click Sign Up. You can now view and download portions of your medical record. 10. Click the Download Summary menu link to download a portable copy of your medical information. Additional Information    If you have questions, please visit the Frequently Asked Questions section of the ALOSKO website at [email or web address] Surya [employer] [email or web address]/[email or web address] Remember, ALOSKO is NOT to be used for urgent needs. For medical emergencies, dial 911. Follow-up and Dispositions    · Return in about 3 months (around 2019), or if symptoms worsen or fail to improve.        Night time carpal tunnel splint    I have reviewed with the patient details of the assessment and plan and all questions were answered. Relevent patient education was performed. The most recent lab findings were reviewed with the patient. An After Visit Summary was printed and given to the patient.

## 2019-08-23 NOTE — PATIENT INSTRUCTIONS
Graspr Activation    Thank you for requesting access to Graspr. Please follow the instructions below to securely access and download your online medical record. Graspr allows you to send messages to your doctor, view your test results, renew your prescriptions, schedule appointments, and more. How Do I Sign Up? 1. In your internet browser, go to [email or web address]  2. Click on the First Time User? Click Here link in the Sign In box. You will be redirect to the New Member Sign Up page. 3. Enter your Graspr Access Code exactly as it appears below. You will not need to use this code after youve completed the sign-up process. If you do not sign up before the expiration date, you must request a new code. Graspr Access Code: [email or web address]  Expires: 2019 10:07 AM (This is the date your Graspr access code will )    4. Enter the last four digits of your Social Security Number (xxxx) and Date of Birth (mm/dd/yyyy) as indicated and click Submit. You will be taken to the next sign-up page. 5. Create a Graspr ID. This will be your Graspr login ID and cannot be changed, so think of one that is secure and easy to remember. 6. Create a Graspr password. You can change your password at any time. 7. Enter your Password Reset Question and Answer. This can be used at a later time if you forget your password. 8. Enter your e-mail address. You will receive e-mail notification when new information is available in [address] 9. Click Sign Up. You can now view and download portions of your medical record. 10. Click the Download Summary menu link to download a portable copy of your medical information. Additional Information    If you have questions, please visit the Frequently Asked Questions section of the Graspr website at [email or web address] Remember, Graspr is NOT to be used for urgent needs. For medical emergencies, dial 911.

## 2019-09-16 ENCOUNTER — OFFICE VISIT (OUTPATIENT)
Dept: INTERNAL MEDICINE CLINIC | Age: 54
End: 2019-09-16

## 2019-09-16 VITALS
BODY MASS INDEX: 29.71 KG/M2 | SYSTOLIC BLOOD PRESSURE: 118 MMHG | OXYGEN SATURATION: 98 % | RESPIRATION RATE: 19 BRPM | HEIGHT: 64 IN | TEMPERATURE: 98.2 F | WEIGHT: 174 LBS | HEART RATE: 61 BPM | DIASTOLIC BLOOD PRESSURE: 70 MMHG

## 2019-09-16 DIAGNOSIS — L50.9 URTICARIA: Primary | ICD-10-CM

## 2019-09-16 RX ORDER — FLUCONAZOLE 150 MG/1
150 TABLET ORAL DAILY
Qty: 1 TAB | Refills: 1 | Status: SHIPPED | OUTPATIENT
Start: 2019-09-16 | End: 2019-09-17

## 2019-09-16 RX ORDER — SULFAMETHOXAZOLE AND TRIMETHOPRIM 800; 160 MG/1; MG/1
TABLET ORAL
Refills: 0 | COMMUNITY
Start: 2019-09-09 | End: 2020-03-26 | Stop reason: ALTCHOICE

## 2019-09-16 RX ORDER — PREDNISONE 10 MG/1
TABLET ORAL
Qty: 21 TAB | Refills: 0 | Status: SHIPPED | OUTPATIENT
Start: 2019-09-16 | End: 2021-01-11 | Stop reason: ALTCHOICE

## 2019-09-16 RX ORDER — TRIAMCINOLONE ACETONIDE 1 MG/G
CREAM TOPICAL
Refills: 0 | COMMUNITY
Start: 2019-09-09 | End: 2021-04-03

## 2019-09-16 NOTE — PROGRESS NOTES
Jie Greene is a 47 y.o. female and presents with Skin Problem  . Subjective:  She has a history of recently being on bactrim  She states had swelling of the thigh region after taking bactrim    She was taking due the bactrim to a insect bite. Review of Systems  Constitutional: negative for fevers, chills, anorexia and weight loss  Eyes:   negative for visual disturbance and irritation  ENT:   negative for tinnitus,sore throat,nasal congestion,ear pains. hoarseness  Respiratory:  negative for cough, hemoptysis, dyspnea,wheezing  CV:   negative for chest pain, palpitations, lower extremity edema  GI:   negative for nausea, vomiting, diarrhea, abdominal pain,melena  Endo:               negative for polyuria,polydipsia,polyphagia,heat intolerance  Genitourinary: negative for frequency, dysuria and hematuria  Integument:    rash and pruritus  Hematologic:  negative for easy bruising and gum/nose bleeding  Musculoskel: negative for myalgias, arthralgias, back pain, muscle weakness, joint pain  Neurological:  negative for headaches, dizziness, vertigo, memory problems and gait   Behavl/Psych: negative for feelings of anxiety, depression, mood changes    History reviewed. No pertinent past medical history.   Past Surgical History:   Procedure Laterality Date    COLONOSCOPY N/A 2017    [facility]per [facility] ENDOSCOPY    HX  SECTION      HX GYN Right     lumpectomy    HX OOPHORECTOMY      partial      Social History     Socioeconomic History    Marital status: SINGLE     Spouse name: Not on file    Number of children: Not on file    Years of education: Not on file    Highest education level: Not on file   Tobacco Use    Smoking status: Never Smoker    Smokeless tobacco: Never Used   Substance and Sexual Activity    Alcohol use: No     Family History   Problem Relation Age of Onset    Heart Disease Father      Current Outpatient Medications   Medication Sig Dispense Refill    predniSONE (DELTASONE) 10 mg tablet 6 tabs today and reduce by 1 tab daily 21 Tab 0    fluconazole (DIFLUCAN) 150 mg tablet Take 1 Tab by mouth daily for 1 day. 1 Tab 1    trimethoprim-sulfamethoxazole (BACTRIM DS, SEPTRA DS) 160-800 mg per tablet take 1 tablet by mouth every 12 hours UNTIL FINISHED  0    triamcinolone acetonide (KENALOG) 0.1 % topical cream apply topically to affected area twice a day UNTIL RESOLVED  0    esomeprazole (NEXIUM) 40 mg capsule Take 1 Cap by mouth daily. (Patient not taking: Reported on 8/23/2019) 30 Cap 12    fluticasone (FLONASE) 50 mcg/actuation nasal spray 2 Sprays by Both Nostrils route daily. (Patient not taking: Reported on 8/23/2019) 1 Bottle 12    cetirizine (ZYRTEC) 10 mg tablet Take 1 Tab by mouth daily. (Patient not taking: Reported on 8/23/2019) 30 Tab 12    clobetasol (TEMOVATE) 0.05 % ointment Apply  to affected area two (2) times a day.  45 g 12     Allergies   Allergen Reactions    Nut Flavor Anaphylaxis    Nuts [Tree Nut] Anaphylaxis    Cafergot [Ergotamine-Caffeine] Nausea Only       Objective:  Visit Vitals  /70 (BP Patient Position: Sitting)   Pulse 61   Temp 98.2 °F (36.8 °C) (Oral)   Resp 19   Ht 5' 4\" (1.626 m)   Wt 174 lb (78.9 kg)   SpO2 98%   BMI 29.87 kg/m²     Physical Exam:   General appearance - alert, well appearing, and in no distress  Mental status - alert, oriented to person, place, and time  EYE-MIRA, EOMI, corneas normal, no foreign bodies  ENT-ENT exam normal, no neck nodes or sinus tenderness  Nose - normal and patent, no erythema, discharge or polyps  Mouth - mucous membranes moist, pharynx normal without lesions  Neck - supple, no significant adenopathy   Chest - clear to auscultation, no wheezes, rales or rhonchi, symmetric air entry   Heart - normal rate, regular rhythm, normal S1, S2, no murmurs, rubs, clicks or gallops   Abdomen - soft, nontender, nondistended, no masses or organomegaly  Lymph- no adenopathy palpable  Ext-peripheral pulses normal, no pedal edema, no clubbing or cyanosis  Skin-scattered lesion rt. thigh  Neuro -alert, oriented, normal speech, no focal findings or movement disorder noted  Neck-normal C-spine, no tenderness, full ROM without pain  Feet-no nail deformities or callus formation with good pulses noted      Results for orders placed or performed in visit on 42/45/97   METABOLIC PANEL, COMPREHENSIVE   Result Value Ref Range    Glucose 83 65 - 99 mg/dL    BUN 11 6 - 24 mg/dL    Creatinine 0.82 0.57 - 1.00 mg/dL    GFR est non-AA 83 >59 mL/min/1.73    GFR est AA 95 >59 mL/min/1.73    BUN/Creatinine ratio 13 9 - 23    Sodium 140 134 - 144 mmol/L    Potassium 4.5 3.5 - 5.2 mmol/L    Chloride 100 96 - 106 mmol/L    CO2 22 20 - 29 mmol/L    Calcium 9.9 8.7 - 10.2 mg/dL    Protein, total 8.1 6.0 - 8.5 g/dL    Albumin 4.7 3.5 - 5.5 g/dL    GLOBULIN, TOTAL 3.4 1.5 - 4.5 g/dL    A-G Ratio 1.4 1.2 - 2.2    Bilirubin, total 0.5 0.0 - 1.2 mg/dL    Alk. phosphatase 61 39 - 117 IU/L    AST (SGOT) 20 0 - 40 IU/L    ALT (SGPT) 15 0 - 32 IU/L   CBC W/O DIFF   Result Value Ref Range    WBC 5.5 3.4 - 10.8 x10E3/uL    RBC 5.24 3.77 - 5.28 x10E6/uL    HGB 14.0 11.1 - 15.9 g/dL    HCT 43.9 34.0 - 46.6 %    MCV 84 79 - 97 fL    MCH 26.7 26.6 - 33.0 pg    MCHC 31.9 31.5 - 35.7 g/dL    RDW 14.9 12.3 - 15.4 %    PLATELET 966 171 - 053 x10E3/uL   RHEUMATOID FACTOR, QL   Result Value Ref Range    Rheumatoid factor <10.0 0.0 - 13.9 IU/mL   LYME AB TOTAL W/RFLX W BLOT   Result Value Ref Range    Lyme Ab, IgG/IgM <0.91 0.00 - 0.90 ISR   SED RATE (ESR)   Result Value Ref Range    Sed rate (ESR) 35 0 - 40 mm/hr       Assessment/Plan:    ICD-10-CM ICD-9-CM    1.  Urticaria L50.9 708.9      Orders Placed This Encounter    trimethoprim-sulfamethoxazole (BACTRIM DS, SEPTRA DS) 160-800 mg per tablet     Sig: take 1 tablet by mouth every 12 hours UNTIL FINISHED     Refill:  0    triamcinolone acetonide (KENALOG) 0.1 % topical cream     Sig: apply topically to affected area twice a day UNTIL RESOLVED     Refill:  0    predniSONE (DELTASONE) 10 mg tablet     Si tabs today and reduce by 1 tab daily     Dispense:  21 Tab     Refill:  0    fluconazole (DIFLUCAN) 150 mg tablet     Sig: Take 1 Tab by mouth daily for 1 day. Dispense:  1 Tab     Refill:  1     lose weight, follow low fat diet, follow low salt diet,Take 81mg aspirin daily  Patient Instructions   MyChart Activation    Thank you for requesting access to TwentyPeople. Please follow the instructions below to securely access and download your online medical record. TwentyPeople allows you to send messages to your doctor, view your test results, renew your prescriptions, schedule appointments, and more. How Do I Sign Up? 1. In your internet browser, go to [email or web address]  2. Click on the First Time User? Click Here link in the Sign In box. You will be redirect to the New Member Sign Up page. 3. Enter your TwentyPeople Access Code exactly as it appears below. You will not need to use this code after youve completed the sign-up process. If you do not sign up before the expiration date, you must request a new code. TwentyPeople Access Code: Yonas Truong  Expires: 10/31/2019 12:39 PM (This is the date your TwentyPeople access code will )    4. Enter the last four digits of your Social Security Number (xxxx) and Date of Birth (mm/dd/yyyy) as indicated and click Submit. You will be taken to the next sign-up page. 5. Create a TwentyPeople ID. This will be your TwentyPeople login ID and cannot be changed, so think of one that is secure and easy to remember. 6. Create a TwentyPeople password. You can change your password at any time. 7. Enter your Password Reset Question and Answer. This can be used at a later time if you forget your password. 8. Enter your e-mail address. You will receive e-mail notification when new information is available in [address] 9. Click Sign Up.  You can now view and download portions of your medical record. 10. Click the Download Summary menu link to download a portable copy of your medical information. Additional Information    If you have questions, please visit the Frequently Asked Questions section of the BioScrip website at [email or web address] Remember, BioScrip is NOT to be used for urgent needs. For medical emergencies, dial 911. Follow-up and Dispositions    · Return in about 1 week (around 9/23/2019), or if symptoms worsen or fail to improve. I have reviewed with the patient details of the assessment and plan and all questions were answered. Relevent patient education was performed. The most recent lab findings were reviewed with the patient. An After Visit Summary was printed and given to the patient.

## 2019-09-16 NOTE — PROGRESS NOTES
Chief Complaint   Patient presents with    Skin Problem     1. Have you been to the ER, urgent care clinic since your last visit? Hospitalized since your last visit? YES  [facility] PRINCE    2. Have you seen or consulted any other health care providers outside of the [address] [place] [address] since your last visit? Include any pap smears or colon screening.  YES

## 2019-09-16 NOTE — PATIENT INSTRUCTIONS
fintonic Activation    Thank you for requesting access to fintonic. Please follow the instructions below to securely access and download your online medical record. fintonic allows you to send messages to your doctor, view your test results, renew your prescriptions, schedule appointments, and more. How Do I Sign Up? 1. In your internet browser, go to [email or web address]  2. Click on the First Time User? Click Here link in the Sign In box. You will be redirect to the New Member Sign Up page. 3. Enter your fintonic Access Code exactly as it appears below. You will not need to use this code after youve completed the sign-up process. If you do not sign up before the expiration date, you must request a new code. fintonic Access Code: Nate Luis  Expires: 10/31/2019 12:39 PM (This is the date your fintonic access code will )    4. Enter the last four digits of your Social Security Number (xxxx) and Date of Birth (mm/dd/yyyy) as indicated and click Submit. You will be taken to the next sign-up page. 5. Create a fintonic ID. This will be your fintonic login ID and cannot be changed, so think of one that is secure and easy to remember. 6. Create a fintonic password. You can change your password at any time. 7. Enter your Password Reset Question and Answer. This can be used at a later time if you forget your password. 8. Enter your e-mail address. You will receive e-mail notification when new information is available in [address] 9. Click Sign Up. You can now view and download portions of your medical record. 10. Click the Download Summary menu link to download a portable copy of your medical information. Additional Information    If you have questions, please visit the Frequently Asked Questions section of the fintonic website at [email or web address] Remember, fintonic is NOT to be used for urgent needs. For medical emergencies, dial 911.

## 2020-03-26 ENCOUNTER — OFFICE VISIT (OUTPATIENT)
Dept: INTERNAL MEDICINE CLINIC | Age: 55
End: 2020-03-26

## 2020-03-26 DIAGNOSIS — N76.1 CHRONIC VAGINITIS: Primary | ICD-10-CM

## 2020-03-26 RX ORDER — METRONIDAZOLE 500 MG/1
TABLET ORAL
Qty: 4 TAB | Refills: 0 | Status: SHIPPED | OUTPATIENT
Start: 2020-03-26 | End: 2021-01-11 | Stop reason: ALTCHOICE

## 2020-03-26 RX ORDER — DOXYCYCLINE 100 MG/1
100 CAPSULE ORAL 2 TIMES DAILY
Qty: 14 CAP | Refills: 0 | Status: SHIPPED | OUTPATIENT
Start: 2020-03-26 | End: 2021-01-11 | Stop reason: ALTCHOICE

## 2020-03-26 RX ORDER — FLUCONAZOLE 150 MG/1
150 TABLET ORAL DAILY
Qty: 1 TAB | Refills: 0 | Status: SHIPPED | OUTPATIENT
Start: 2020-03-26 | End: 2020-03-27

## 2020-03-26 NOTE — PROGRESS NOTES
Garett Mansfield is a 49 y.o. female and presents with No chief complaint on file. .  Subjective: This is a virtual visit    She has reported a vaginal discharge and odor the past few days  She request something for treatment    Review of Systems  Constitutional: negative for fevers, chills, anorexia and weight loss  Eyes:   negative for visual disturbance and irritation  ENT:   negative for tinnitus,sore throat,nasal congestion,ear pains. hoarseness  Respiratory:  negative for cough, hemoptysis, dyspnea,wheezing  CV:   negative for chest pain, palpitations, lower extremity edema  GI:   negative for nausea, vomiting, diarrhea, abdominal pain,melena  Endo:               negative for polyuria,polydipsia,polyphagia,heat intolerance  Genitourinary: negative for frequency, dysuria and hematuria  Integument:  negative for rash and pruritus  Hematologic:  negative for easy bruising and gum/nose bleeding  Musculoskel: negative for myalgias, arthralgias, back pain, muscle weakness, joint pain  Neurological:  negative for headaches, dizziness, vertigo, memory problems and gait   Behavl/Psych: negative for feelings of anxiety, depression, mood changes    No past medical history on file.   Past Surgical History:   Procedure Laterality Date    COLONOSCOPY N/A 2017    COL[facility] performed [facility]ad [facility] at [facility] ENDOSCOPY    HX  SECTION      HX GYN Right     lumpectomy    HX OOPHORECTOMY      partial      Social History     Socioeconomic History    Marital status: SINGLE     Spouse name: Not on file    Number of children: Not on file    Years of education: Not on file    Highest education level: Not on file   Tobacco Use    Smoking status: Never Smoker    Smokeless tobacco: Never Used   Substance and Sexual Activity    Alcohol use: No     Family History   Problem Relation Age of Onset    Heart Disease Father      Current Outpatient Medications   Medication Sig Dispense Refill    fluconazole (DIFLUCAN) 150 mg tablet Take 1 Tab by mouth daily for 1 day. 1 Tab 0    metroNIDAZOLE (FLAGYL) 500 mg tablet Si tabs bid for 1 day 4 Tab 0    doxycycline (VIBRAMYCIN) 100 mg capsule Take 1 Cap by mouth two (2) times a day. 14 Cap 0    triamcinolone acetonide (KENALOG) 0.1 % topical cream apply topically to affected area twice a day UNTIL RESOLVED  0    predniSONE (DELTASONE) 10 mg tablet 6 tabs today and reduce by 1 tab daily 21 Tab 0    esomeprazole (NEXIUM) 40 mg capsule Take 1 Cap by mouth daily. (Patient not taking: Reported on 2019) 30 Cap 12    fluticasone (FLONASE) 50 mcg/actuation nasal spray 2 Sprays by Both Nostrils route daily. (Patient not taking: Reported on 2019) 1 Bottle 12    cetirizine (ZYRTEC) 10 mg tablet Take 1 Tab by mouth daily. (Patient not taking: Reported on 2019) 30 Tab 12    clobetasol (TEMOVATE) 0.05 % ointment Apply  to affected area two (2) times a day. 45 g 12     Allergies   Allergen Reactions    Bactrim [Sulfamethoprim] Rash    Nut Flavor Anaphylaxis    Nuts [Tree Nut] Anaphylaxis    Cafergot [Ergotamine-Caffeine] Nausea Only       Objective: There were no vitals taken for this visit.   Physical Exam:   General appearance - alert, well appearing, and in no distress  Mental status - alert, oriented to person, place, and time  EYE-MIRA, EOMI, corneas normal, no foreign bodies  ENT-ENT exam normal, no neck nodes or sinus tenderness  Nose - normal and patent, no erythema, discharge or polyps        Results for orders placed or performed in visit on    METABOLIC PANEL, COMPREHENSIVE   Result Value Ref Range    Glucose 83 65 - 99 mg/dL    BUN 11 6 - 24 mg/dL    Creatinine 0.82 0.57 - 1.00 mg/dL    GFR est non-AA 83 >59 mL/min/1.73    GFR est AA 95 >59 mL/min/1.73    BUN/Creatinine ratio 13 9 - 23    Sodium 140 134 - 144 mmol/L    Potassium 4.5 3.5 - 5.2 mmol/L    Chloride 100 96 - 106 mmol/L    CO2 22 20 - 29 mmol/L    Calcium 9.9 8.7 - 10.2 mg/dL Protein, total 8.1 6.0 - 8.5 g/dL    Albumin 4.7 3.5 - 5.5 g/dL    GLOBULIN, TOTAL 3.4 1.5 - 4.5 g/dL    A-G Ratio 1.4 1.2 - 2.2    Bilirubin, total 0.5 0.0 - 1.2 mg/dL    Alk. phosphatase 61 39 - 117 IU/L    AST (SGOT) 20 0 - 40 IU/L    ALT (SGPT) 15 0 - 32 IU/L   CBC W/O DIFF   Result Value Ref Range    WBC 5.5 3.4 - 10.8 x10E3/uL    RBC 5.24 3.77 - 5.28 x10E6/uL    HGB 14.0 11.1 - 15.9 g/dL    HCT 43.9 34.0 - 46.6 %    MCV 84 79 - 97 fL    MCH 26.7 26.6 - 33.0 pg    MCHC 31.9 31.5 - 35.7 g/dL    RDW 14.9 12.3 - 15.4 %    PLATELET 905 376 - 444 x10E3/uL   RHEUMATOID FACTOR, QL   Result Value Ref Range    Rheumatoid factor <10.0 0.0 - 13.9 IU/mL   LYME AB TOTAL W/RFLX W BLOT   Result Value Ref Range    Lyme Ab, IgG/IgM <0.91 0.00 - 0.90 ISR   SED RATE (ESR)   Result Value Ref Range    Sed rate (ESR) 35 0 - 40 mm/hr       Assessment/Plan:    ICD-10-CM ICD-9-CM    1. Chronic vaginitis N76.1 616.10      Orders Placed This Encounter    fluconazole (DIFLUCAN) 150 mg tablet     Sig: Take 1 Tab by mouth daily for 1 day. Dispense:  1 Tab     Refill:  0    metroNIDAZOLE (FLAGYL) 500 mg tablet     Sig: Si tabs bid for 1 day     Dispense:  4 Tab     Refill:  0    doxycycline (VIBRAMYCIN) 100 mg capsule     Sig: Take 1 Cap by mouth two (2) times a day. Dispense:  14 Cap     Refill:  0     call if any problems,Take 81mg aspirin daily  Patient Instructions   Ramesys (e-Business) Serviceshart Activation    Thank you for requesting access to ioSemantics. Please follow the instructions below to securely access and download your online medical record. ioSemantics allows you to send messages to your doctor, view your test results, renew your prescriptions, schedule appointments, and more. How Do I Sign Up? 1. In your internet browser, go to [email or web address]  2. Click on the First Time User? Click Here link in the Sign In box. You will be redirect to the New Member Sign Up page. 3. Enter your ioSemantics Access Code exactly as it appears below. You will not need to use this code after youve completed the sign-up process. If you do not sign up before the expiration date, you must request a new code. Ad Summos Access Code: Activation code not generated  Current Ad Summos Status: Active (This is the date your Ad Summos access code will )    4. Enter the last four digits of your Social Security Number (xxxx) and Date of Birth (mm/dd/yyyy) as indicated and click Submit. You will be taken to the next sign-up page. 5. Create a Race Nationt ID. This will be your Ad Summos login ID and cannot be changed, so think of one that is secure and easy to remember. 6. Create a Race Nationt password. You can change your password at any time. 7. Enter your Password Reset Question and Answer. This can be used at a later time if you forget your password. 8. Enter your e-mail address. You will receive e-mail notification when new information is available in [address] 9. Click Sign Up. You can now view and download portions of your medical record. 10. Click the Download Summary menu link to download a portable copy of your medical information. Additional Information    If you have questions, please visit the Frequently Asked Questions section of the Ad Summos website at [email or web address] Remember, Ad Summos is NOT to be used for urgent needs. For medical emergencies, dial 911. Follow-up and Dispositions    · Return in about 3 months (around 2020), or if symptoms worsen or fail to improve. I have reviewed with the patient details of the assessment and plan and all questions were answered. Relevent patient education was performed. The most recent lab findings were reviewed with the patient. An After Visit Summary was printed and given to the patient.

## 2020-03-26 NOTE — PATIENT INSTRUCTIONS
Cass ArtharTravelPi Activation    Thank you for requesting access to Sunrun. Please follow the instructions below to securely access and download your online medical record. Sunrun allows you to send messages to your doctor, view your test results, renew your prescriptions, schedule appointments, and more. How Do I Sign Up? 1. In your internet browser, go to [email or web address]  2. Click on the First Time User? Click Here link in the Sign In box. You will be redirect to the New Member Sign Up page. 3. Enter your Sunrun Access Code exactly as it appears below. You will not need to use this code after youve completed the sign-up process. If you do not sign up before the expiration date, you must request a new code. Sunrun Access Code: Activation code not generated  Current Sunrun Status: Active (This is the date your Sunrun access code will )    4. Enter the last four digits of your Social Security Number (xxxx) and Date of Birth (mm/dd/yyyy) as indicated and click Submit. You will be taken to the next sign-up page. 5. Create a Sunrun ID. This will be your Sunrun login ID and cannot be changed, so think of one that is secure and easy to remember. 6. Create a Sunrun password. You can change your password at any time. 7. Enter your Password Reset Question and Answer. This can be used at a later time if you forget your password. 8. Enter your e-mail address. You will receive e-mail notification when new information is available in [address] 9. Click Sign Up. You can now view and download portions of your medical record. 10. Click the Download Summary menu link to download a portable copy of your medical information. Additional Information    If you have questions, please visit the Frequently Asked Questions section of the Sunrun website at [email or web address] Remember, Sunrun is NOT to be used for urgent needs. For medical emergencies, dial 911.

## 2020-05-10 ENCOUNTER — HOSPITAL ENCOUNTER (EMERGENCY)
Age: 55
Discharge: LEFT BEFORE DISCHARGE | End: 2020-05-10
Admitting: EMERGENCY MEDICINE
Payer: COMMERCIAL

## 2020-05-10 PROCEDURE — 75810000275 HC EMERGENCY DEPT VISIT NO LEVEL OF CARE

## 2021-01-11 ENCOUNTER — OFFICE VISIT (OUTPATIENT)
Dept: INTERNAL MEDICINE CLINIC | Age: 56
End: 2021-01-11
Payer: COMMERCIAL

## 2021-01-11 VITALS
RESPIRATION RATE: 20 BRPM | SYSTOLIC BLOOD PRESSURE: 110 MMHG | DIASTOLIC BLOOD PRESSURE: 70 MMHG | BODY MASS INDEX: 32.95 KG/M2 | HEART RATE: 84 BPM | TEMPERATURE: 98 F | WEIGHT: 193 LBS | OXYGEN SATURATION: 99 % | HEIGHT: 64 IN

## 2021-01-11 DIAGNOSIS — R63.5 WEIGHT GAIN: Primary | ICD-10-CM

## 2021-01-11 DIAGNOSIS — E55.9 VITAMIN D DEFICIENCY: ICD-10-CM

## 2021-01-11 DIAGNOSIS — M17.12 PRIMARY OSTEOARTHRITIS OF LEFT KNEE: ICD-10-CM

## 2021-01-11 DIAGNOSIS — N95.9 MENOPAUSAL DISORDER: ICD-10-CM

## 2021-01-11 LAB
25(OH)D3 SERPL-MCNC: 26.2 NG/ML (ref 30–100)
ALBUMIN SERPL-MCNC: 4.4 G/DL (ref 3.5–5)
ALBUMIN/GLOB SERPL: 1.1 {RATIO} (ref 1.1–2.2)
ALP SERPL-CCNC: 79 U/L (ref 45–117)
ALT SERPL-CCNC: 29 U/L (ref 12–78)
ANION GAP SERPL CALC-SCNC: 2 MMOL/L (ref 5–15)
AST SERPL-CCNC: 17 U/L (ref 15–37)
BASOPHILS # BLD: 0.1 K/UL (ref 0–0.1)
BASOPHILS NFR BLD: 1 % (ref 0–1)
BILIRUB SERPL-MCNC: 0.4 MG/DL (ref 0.2–1)
BUN SERPL-MCNC: 12 MG/DL (ref 6–20)
BUN/CREAT SERPL: 12 (ref 12–20)
CALCIUM SERPL-MCNC: 9.6 MG/DL (ref 8.5–10.1)
CHLORIDE SERPL-SCNC: 107 MMOL/L (ref 97–108)
CO2 SERPL-SCNC: 30 MMOL/L (ref 21–32)
CREAT SERPL-MCNC: 0.97 MG/DL (ref 0.55–1.02)
DIFFERENTIAL METHOD BLD: NORMAL
EOSINOPHIL # BLD: 0.2 K/UL (ref 0–0.4)
EOSINOPHIL NFR BLD: 3 % (ref 0–7)
ERYTHROCYTE [DISTWIDTH] IN BLOOD BY AUTOMATED COUNT: 14.2 % (ref 11.5–14.5)
GLOBULIN SER CALC-MCNC: 4 G/DL (ref 2–4)
GLUCOSE SERPL-MCNC: 101 MG/DL (ref 65–100)
HCT VFR BLD AUTO: 45.6 % (ref 35–47)
HGB BLD-MCNC: 14.2 G/DL (ref 11.5–16)
IMM GRANULOCYTES # BLD AUTO: 0 K/UL (ref 0–0.04)
IMM GRANULOCYTES NFR BLD AUTO: 0 % (ref 0–0.5)
LYMPHOCYTES # BLD: 2 K/UL (ref 0.8–3.5)
LYMPHOCYTES NFR BLD: 35 % (ref 12–49)
MCH RBC QN AUTO: 27.4 PG (ref 26–34)
MCHC RBC AUTO-ENTMCNC: 31.1 G/DL (ref 30–36.5)
MCV RBC AUTO: 87.9 FL (ref 80–99)
MONOCYTES # BLD: 0.5 K/UL (ref 0–1)
MONOCYTES NFR BLD: 9 % (ref 5–13)
NEUTS SEG # BLD: 2.9 K/UL (ref 1.8–8)
NEUTS SEG NFR BLD: 52 % (ref 32–75)
NRBC # BLD: 0 K/UL (ref 0–0.01)
NRBC BLD-RTO: 0 PER 100 WBC
PLATELET # BLD AUTO: 282 K/UL (ref 150–400)
PMV BLD AUTO: 11.7 FL (ref 8.9–12.9)
POTASSIUM SERPL-SCNC: 4.9 MMOL/L (ref 3.5–5.1)
PROT SERPL-MCNC: 8.4 G/DL (ref 6.4–8.2)
RBC # BLD AUTO: 5.19 M/UL (ref 3.8–5.2)
SODIUM SERPL-SCNC: 139 MMOL/L (ref 136–145)
WBC # BLD AUTO: 5.6 K/UL (ref 3.6–11)

## 2021-01-11 PROCEDURE — 99214 OFFICE O/P EST MOD 30 MIN: CPT | Performed by: INTERNAL MEDICINE

## 2021-01-11 NOTE — PATIENT INSTRUCTIONS
[employer] Activation Thank you for requesting access to [employer]. Please follow the instructions below to securely access and download your online medical record. [employer] allows you to send messages to your doctor, view your test results, renew your prescriptions, schedule appointments, and more. How Do I Sign Up? 1. In your internet browser, go to [email or web address] 
2. Click on the First Time User? Click Here link in the Sign In box. You will be redirect to the New Member Sign Up page. 3. Enter your [employer] Access Code exactly as it appears below. You will not need to use this code after youve completed the sign-up process. If you do not sign up before the expiration date, you must request a new code. [employer] Access Code: Activation code not generated Current [employer] Status: Active (This is the date your [employer] access code will ) 4. Enter the last four digits of your Social Security Number (xxxx) and Date of Birth (mm/dd/yyyy) as indicated and click Submit. You will be taken to the next sign-up page. 5. Create a [employer] ID. This will be your [employer] login ID and cannot be changed, so think of one that is secure and easy to remember. 6. Create a [employer] password. You can change your password at any time. 7. Enter your Password Reset Question and Answer. This can be used at a later time if you forget your password. 8. Enter your e-mail address. You will receive e-mail notification when new information is available in [address] 9. Click Sign Up. You can now view and download portions of your medical record. 10. Click the Download Summary menu link to download a portable copy of your medical information. Additional Information If you have questions, please visit the Frequently Asked Questions section of the [employer] Innovate website at [email or web address] Remember, [employer]ate is NOT to be used for urgent needs. For medical emergencies, dial 911.

## 2021-01-11 NOTE — PROGRESS NOTES
Stacie Jett is a 48 y.o. female and presents with Knee Pain  . Subjective:  Knee pain Review:  Patient complains of right knee pain. Onset of the symptoms was months  ago. Inciting event: longstanding problem which has been getting worse. Current symptoms include pain location: both: medial and swelling. none,  Aggravating symptoms: going up and down stairs, walking, lateral movements, any weight bearing. Patient's overall course: gradually worsening Patient has had prior knee problems. Evaluation to date: none. Treatment to date:NSAIDS   She is undergoing physical therapy with some relief. She has been concerned about her weight recently      Review of Systems  Constitutional: negative for fevers, chills, anorexia and weight loss  Eyes:   negative for visual disturbance and irritation  ENT:   negative for tinnitus,sore throat,nasal congestion,ear pains. hoarseness  Respiratory:  negative for cough, hemoptysis, dyspnea,wheezing  CV:   negative for chest pain, palpitations, lower extremity edema  GI:   negative for nausea, vomiting, diarrhea, abdominal pain,melena  Endo:               negative for polyuria,polydipsia,polyphagia,heat intolerance  Genitourinary: negative for frequency, dysuria and hematuria  Integument:  negative for rash and pruritus  Hematologic:  negative for easy bruising and gum/nose bleeding  Musculoskel: negative for myalgias, arthralgias, back pain, muscle weakness, joint pain  Neurological:  negative for headaches, dizziness, vertigo, memory problems and gait   Behavl/Psych: negative for feelings of anxiety, depression, mood changes    History reviewed. No pertinent past medical history.   Past Surgical History:   Procedure Laterality Date    COLONOSCOPY N/A 2017    [facility]COPY    HX  SECTION      HX GYN Right     lumpectomy    HX OOPHORECTOMY      partial      Social History     Socioeconomic History    Marital status: SINGLE     Spouse name: Not on file    Number of children: Not on file    Years of education: Not on file    Highest education level: Not on file   Tobacco Use    Smoking status: Never Smoker    Smokeless tobacco: Never Used   Substance and Sexual Activity    Alcohol use: No    Drug use: Never     Family History   Problem Relation Age of Onset    Heart Disease Father      Current Outpatient Medications   Medication Sig Dispense Refill    triamcinolone acetonide (KENALOG) 0.1 % topical cream apply topically to affected area twice a day UNTIL RESOLVED  0    esomeprazole (NEXIUM) 40 mg capsule Take 1 Cap by mouth daily. (Patient not taking: Reported on 8/23/2019) 30 Cap 12    fluticasone (FLONASE) 50 mcg/actuation nasal spray 2 Sprays by Both Nostrils route daily. (Patient not taking: Reported on 8/23/2019) 1 Bottle 12    cetirizine (ZYRTEC) 10 mg tablet Take 1 Tab by mouth daily. (Patient not taking: Reported on 8/23/2019) 30 Tab 12    clobetasol (TEMOVATE) 0.05 % ointment Apply  to affected area two (2) times a day.  45 g 12     Allergies   Allergen Reactions    Bactrim [Sulfamethoprim] Rash    Nut Flavor Anaphylaxis    Nuts [Tree Nut] Anaphylaxis    Cafergot [Ergotamine-Caffeine] Nausea Only       Objective:  Visit Vitals  /70 (BP 1 Location: Right arm, BP Patient Position: Sitting)   Pulse 84   Temp 98 °F (36.7 °C) (Oral)   Resp 20   Ht 5' 4\" (1.626 m)   Wt 193 lb (87.5 kg)   LMP 09/01/2020 (Approximate)   SpO2 99%   BMI 33.13 kg/m²     Physical Exam:   General appearance - alert, well appearing, and in no distress  Mental status - alert, oriented to person, place, and time  EYE-MIRA, EOMI, corneas normal, no foreign bodies  ENT-ENT exam normal, no neck nodes or sinus tenderness  Nose - normal and patent, no erythema, discharge or polyps  Mouth - mucous membranes moist, pharynx normal without lesions  Neck - supple, no significant adenopathy   Chest - clear to auscultation, no wheezes, rales or rhonchi, symmetric air entry   Heart - normal rate, regular rhythm, normal S1, S2, no murmurs, rubs, clicks or gallops   Abdomen - soft, nontender, nondistended, no masses or organomegaly  Lymph- no adenopathy palpable  Ext-peripheral pulses normal, no pedal edema, no clubbing or cyanosis  Skin-Warm and dry. no hyperpigmentation, vitiligo, or suspicious lesions  Neuro -alert, oriented, normal speech, no focal findings or movement disorder noted  Neck-normal C-spine, no tenderness, full ROM without pain  Feet-no nail deformities or callus formation with good pulses noted      Results for orders placed or performed in visit on 04/53/93   METABOLIC PANEL, COMPREHENSIVE   Result Value Ref Range    Glucose 83 65 - 99 mg/dL    BUN 11 6 - 24 mg/dL    Creatinine 0.82 0.57 - 1.00 mg/dL    GFR est non-AA 83 >59 mL/min/1.73    GFR est AA 95 >59 mL/min/1.73    BUN/Creatinine ratio 13 9 - 23    Sodium 140 134 - 144 mmol/L    Potassium 4.5 3.5 - 5.2 mmol/L    Chloride 100 96 - 106 mmol/L    CO2 22 20 - 29 mmol/L    Calcium 9.9 8.7 - 10.2 mg/dL    Protein, total 8.1 6.0 - 8.5 g/dL    Albumin 4.7 3.5 - 5.5 g/dL    GLOBULIN, TOTAL 3.4 1.5 - 4.5 g/dL    A-G Ratio 1.4 1.2 - 2.2    Bilirubin, total 0.5 0.0 - 1.2 mg/dL    Alk. phosphatase 61 39 - 117 IU/L    AST (SGOT) 20 0 - 40 IU/L    ALT (SGPT) 15 0 - 32 IU/L   CBC W/O DIFF   Result Value Ref Range    WBC 5.5 3.4 - 10.8 x10E3/uL    RBC 5.24 3.77 - 5.28 x10E6/uL    HGB 14.0 11.1 - 15.9 g/dL    HCT 43.9 34.0 - 46.6 %    MCV 84 79 - 97 fL    MCH 26.7 26.6 - 33.0 pg    MCHC 31.9 31.5 - 35.7 g/dL    RDW 14.9 12.3 - 15.4 %    PLATELET 941 063 - 388 x10E3/uL   RHEUMATOID FACTOR, QL   Result Value Ref Range    Rheumatoid factor <10.0 0.0 - 13.9 IU/mL   LYME AB TOTAL W/RFLX W BLOT   Result Value Ref Range    Lyme Ab, IgG/IgM <0.91 0.00 - 0.90 ISR   SED RATE (ESR)   Result Value Ref Range    Sed rate (ESR) 35 0 - 40 mm/hr       Assessment/Plan:    ICD-10-CM ICD-9-CM    1. Weight gain  R63.5 783.1    2. Menopausal disorder  N95.9 627.9 REFERRAL TO GYNECOLOGY      METABOLIC PANEL, COMPREHENSIVE      CBC WITH AUTOMATED DIFF   3. Primary osteoarthritis of left knee  P59.64 589.25 METABOLIC PANEL, COMPREHENSIVE      CBC WITH AUTOMATED DIFF   4. Vitamin D deficiency  E55.9 268.9 VITAMIN D, 25 HYDROXY     Orders Placed This Encounter    METABOLIC PANEL, COMPREHENSIVE     Standing Status:   Future     Standing Expiration Date:   1/11/2022    CBC WITH AUTOMATED DIFF     Standing Status:   Future     Standing Expiration Date:   1/11/2022    VITAMIN D, 25 HYDROXY     Standing Status:   Future     Standing Expiration Date:   1/11/2022    REFERRAL TO GYNECOLOGY     Referral Priority:   Routine     Referral Type:   Consultation     Referral Reason:   Specialty Services Required     Referral Location:   Dr. Nile Dakin. Referred to Provider:   Nichole Aviles MD     Requested Specialty:   Gynecology     Number of Visits Requested:   1     lose weight, increase physical activity, follow low salt diet, continue present plan,Take 81mg aspirin daily  Patient Instructions   Net[employer]arity Activation    Thank you for requesting access to Net[employer]arity. Please follow the instructions below to securely access and download your online medical record. NetClarity allows you to send messages to your doctor, view your test results, renew your prescriptions, schedule appointments, and more. How Do I Sign Up? 1. In your internet browser, go to [email or web address]  2. Click on the First Time User? Click Here link in the Sign In box. You will be redirect to the New Member Sign Up page. 3. Enter your NetClarity Access Code exactly as it appears below. You will not need to use this code after youve completed the sign-up process. If you do not sign up before the expiration date, you must request a new code. NetClarity Access Code:  Activation code not generated  Current Net[employer]arity Status: Active (This is the date your [employer] access code will )    4. Enter the last four digits of your Social Security Number (xxxx) and Date of Birth (mm/dd/yyyy) as indicated and click Submit. You will be taken to the next sign-up page. 5. Create a [employer] ID. This will be your [employer] login ID and cannot be changed, so think of one that is secure and easy to remember. 6. Create a [employer] password. You can change your password at any time. 7. Enter your Password Reset Question and Answer. This can be used at a later time if you forget your password. 8. Enter your e-mail address. You will receive e-mail notification when new information is available in [address] 9. Click Sign Up. You can now view and download portions of your medical record. 10. Click the Download Summary menu link to download a portable copy of your medical information. Additional Information    If you have questions, please visit the Frequently Asked Questions section of the [employer] website at [email or web address] Remember, [employer] is NOT to be used for urgent needs. For medical emergencies, dial 911. I have reviewed with the patient details of the assessment and plan and all questions were answered. Relevent patient education was performed. The most recent lab findings were reviewed with the patient. An After Visit Summary was printed and given to the patient.

## 2021-02-27 LAB — SARS-COV-2, NAA: NOT DETECTED

## 2021-03-16 RX ORDER — PREDNISONE 10 MG/1
TABLET ORAL
Qty: 21 TAB | Refills: 0 | Status: SHIPPED | OUTPATIENT
Start: 2021-03-16 | End: 2021-04-03

## 2021-04-03 ENCOUNTER — HOSPITAL ENCOUNTER (EMERGENCY)
Age: 56
Discharge: HOME | End: 2021-04-03
Attending: FAMILY MEDICINE
Payer: COMMERCIAL

## 2021-04-03 VITALS
HEART RATE: 80 BPM | DIASTOLIC BLOOD PRESSURE: 87 MMHG | TEMPERATURE: 98 F | WEIGHT: 183 LBS | HEIGHT: 65 IN | RESPIRATION RATE: 16 BRPM | BODY MASS INDEX: 30.49 KG/M2 | SYSTOLIC BLOOD PRESSURE: 129 MMHG

## 2021-04-03 DIAGNOSIS — S90.425A BLISTER OF TOE OF LEFT FOOT WITHOUT INFECTION, INITIAL ENCOUNTER: Primary | ICD-10-CM

## 2021-04-03 DIAGNOSIS — L30.9 DERMATITIS: ICD-10-CM

## 2021-04-03 PROCEDURE — 99282 EMERGENCY DEPT VISIT SF MDM: CPT

## 2021-04-03 RX ORDER — BACITRACIN 500 [USP'U]/G
OINTMENT TOPICAL 3 TIMES DAILY
Qty: 1 TUBE | Refills: 0 | Status: SHIPPED | OUTPATIENT
Start: 2021-04-03 | End: 2021-04-06 | Stop reason: ALTCHOICE

## 2021-04-03 RX ORDER — HYDROCORTISONE 0.5 %
1 OINTMENT (GRAM) TOPICAL 2 TIMES DAILY
Qty: 1 TUBE | Refills: 0 | Status: SHIPPED | OUTPATIENT
Start: 2021-04-03 | End: 2021-04-13

## 2021-04-03 NOTE — ED TRIAGE NOTES
Thinks she got bit by a spider on the left second and third toe about 3 weeks ago has been on meds for it; the skin on her toe itches and peels off; Last night had hives and noted an insect bite to the left forearm; unsure if she has necrosis of the toe and wants it checked out

## 2021-04-03 NOTE — ED PROVIDER NOTES
EMERGENCY DEPARTMENT HISTORY AND PHYSICAL EXAM      Date: 4/3/2021  Patient Name: Colin Brooks    History of Presenting Illness     Chief Complaint   Patient presents with    Insect Bite       History Provided By: Patient    HPI: Colin Brooks, 48 y.o. female with a past medical history as documented below, presents to the ED with cc of blister on left foot and rash on left forearm. + Left foot have been there for several weeks ever since she got bit by a spider. Her primary care doctor have been treating her with antibiotics and prednisone which she has completed therapy but the blister rupture, resolve, then return several days later. It is located between the third and fourth toe on the left foot. Is nontender. No purulent discharge or bleeding. She also states that she developed a rash on the inner part of the left arm a few days ago that is itchy and irritating. No OTC treatment. Last night she developed hives that resolved after taking Benadryl. No sore throat, no trouble swallowing, no trouble breathing, no chest pain, palpitations, numbness and tingling in hands or feet, fever, body aches, chills, dysuria, and all other symptoms are negative. There are no other complaints, changes, or physical findings at this time. PCP: Rama Thao MD    No current facility-administered medications on file prior to encounter. Current Outpatient Medications on File Prior to Encounter   Medication Sig Dispense Refill    [DISCONTINUED] predniSONE (DELTASONE) 10 mg tablet 6 tabs today and reduce by 1 tab daily 21 Tab 0    [DISCONTINUED] triamcinolone acetonide (KENALOG) 0.1 % topical cream apply topically to affected area twice a day UNTIL RESOLVED  0    [DISCONTINUED] esomeprazole (NEXIUM) 40 mg capsule Take 1 Cap by mouth daily.  (Patient not taking: Reported on 8/23/2019) 30 Cap 12    [DISCONTINUED] fluticasone (FLONASE) 50 mcg/actuation nasal spray 2 Sprays by Both Nostrils route daily. (Patient not taking: Reported on 2019) 1 Bottle 12    [DISCONTINUED] cetirizine (ZYRTEC) 10 mg tablet Take 1 Tab by mouth daily. (Patient not taking: Reported on 2019) 30 Tab 12    [DISCONTINUED] clobetasol (TEMOVATE) 0.05 % ointment Apply  to affected area two (2) times a day. 45 g 12       Past History     Past Medical History:  History reviewed. No pertinent past medical history. Past Surgical History:  Past Surgical History:   Procedure Laterality Date    COLONOSCOPY N/A 2017    COLONOS[facility] performed [facility] Marlyn Lei MD at [facility] ENDOSCOPY    HX  SECTION      HX GYN Right     lumpectomy    HX OOPHORECTOMY      partial        Family History:  Family History   Problem Relation Age of Onset    Heart Disease Father        Social History:  Social History     Tobacco Use    Smoking status: Never Smoker    Smokeless tobacco: Never Used   Substance Use Topics    Alcohol use: No    Drug use: Never       Allergies: Allergies   Allergen Reactions    Bactrim [Sulfamethoprim] Rash    Nut Flavor Anaphylaxis    Nuts [Tree Nut] Anaphylaxis    Cafergot [Ergotamine-Caffeine] Nausea Only         Review of Systems     Review of Systems   Constitutional: Negative for chills and fever. HENT: Negative for congestion and sore throat. Eyes: Negative for photophobia and visual disturbance. Respiratory: Negative for cough and shortness of breath. Cardiovascular: Negative for chest pain and palpitations. Gastrointestinal: Negative for nausea and vomiting. Genitourinary: Negative for dysuria and flank pain. Musculoskeletal: Negative for arthralgias, back pain and myalgias. Skin: Positive for rash and wound. Allergic/Immunologic: Negative for environmental allergies and food allergies. Neurological: Negative for light-headedness and headaches. Physical Exam     Physical Exam  Vitals signs and nursing note reviewed.  Exam conducted with a chaperone present (Nurse timoteo). Constitutional:       Appearance: Normal appearance. She is normal weight. HENT:      Head: Normocephalic and atraumatic. Eyes:      Extraocular Movements: Extraocular movements intact. Conjunctiva/sclera: Conjunctivae normal.   Cardiovascular:      Pulses:           Dorsalis pedis pulses are 2+ on the right side and 2+ on the left side. Comments: Patient refused exam  Pulmonary:      Comments: Patient Refused exam  Abdominal:      Comments: Patient refused exam   Musculoskeletal:        Feet:       Comments: Patient refuse exam   Feet:      Right foot:      Skin integrity: Skin integrity normal.      Toenail Condition: Right toenails are normal.      Left foot:      Skin integrity: Blister present. No ulcer, skin breakdown, erythema, warmth, callus, dry skin or fissure. Toenail Condition: Left toenails are normal.   Skin:     General: Skin is warm. Capillary Refill: Capillary refill takes less than 2 seconds. Findings: Rash is not urticarial.             Comments: 2 x 2 cm erythematous rash with defined border and pruritic. No warmth or tenderness. No hives/urticarial rash found on B/L upper and lower extremities. Patient refused evaluation of the skin on the trunk, back, and neck. Neurological:      General: No focal deficit present. Mental Status: She is alert and oriented to person, place, and time. Psychiatric:         Mood and Affect: Mood normal.         Behavior: Behavior normal.         Thought Content: Thought content normal.         Judgment: Judgment normal.         Lab and Diagnostic Study Results     Labs -   No results found for this or any previous visit (from the past 12 hour(s)). Radiologic Studies -   @lastxrresult@  CT Results  (Last 48 hours)    None        CXR Results  (Last 48 hours)    None            Medical Decision Making   - I am the first provider for this patient.     - I reviewed the vital signs, available nursing notes, past medical history, past surgical history, family history and social history. - Initial assessment performed. The patients presenting problems have been discussed, and they are in agreement with the care plan formulated and outlined with them. I have encouraged them to ask questions as they arise throughout their visit. Vital Signs-Reviewed the patient's vital signs. Patient Vitals for the past 12 hrs:   Temp Pulse Resp BP   04/03/21 1204 98 °F (36.7 °C) 80 16 129/87       Records Reviewed: Nursing Notes    ED Course:          Provider Notes (Medical Decision Making):     MDM  Number of Diagnoses or Management Options  Blister of toe of left foot without infection, initial encounter  Dermatitis  Diagnosis management comments: 12:36 PM  Patient is stable with no marked toxicity or distress. Explained to patient that the rash on the upper forearm possibly not related to her bug bite that is just likely skin irritation such as dermatitis. Also explained that the blister is found on her foot is due to exposure to excessive bolster and not likely caused from a bug bite since it has been several weeks is there and it comes and goes on its own. There is no signs of infection on the 2 areas where the rash and blister is located. Patient demanded lab testing for this again findings. Explained to the patient that labs are unnecessary since this is skin problem that is clinically diagnosis through HPI and physical findings. Patient got a upset because she wanted lab testing and started to argue back. Nurse timoteo was present in the room at the time. Explained to the patient that I will gladly do further physical exam to differentiate if she needs further testing but she declined. Also explained to patient that she may have had a mild allergic reaction since there is no physical findings and she denied anaphylactic symptoms.   Also explained that usually this is caused by something that she may have intake however patient got argumentative that her diet has not changed. Explained to patient the development of allergic reaction from food even food that you have been exposed to in the past and highly recommend for her to follow-up with an allergist for further testing because there are no lab test done in the ER to determine the allergen. Also recommend to fully pay close attention to what she intakes and what she places on her body and if she develops another reaction to avoid that food or substance. Provided her information of a podiatrist to follow-up with as well as an allergist.  All questions were answered and there are no apparent barriers to comprehension or communication. The patient verbalized agreement with the diagnosis, treatment plan, and understanding of the follow-up instructions. The patient is appropriate for discharge; leaves the Emergency Department walking with a stable gait. Patient understands to return to the ED in 12-24 hours for any new or worsening symptoms or no  expected timely resolution of symptoms on mediations prescribed. Disposition   Disposition: Condition stable  DC- Adult Discharges: All of the diagnostic tests were reviewed and questions answered. Diagnosis, care plan and treatment options were discussed. The patient understands the instructions and will follow up as directed. The patients results have been reviewed with them. They have been counseled regarding their diagnosis. The patient verbally convey understanding and agreement of the signs, symptoms, diagnosis, treatment and prognosis and additionally agrees to follow up as recommended with their PCP in 24 - 48 hours. They also agree with the care-plan and convey that all of their questions have been answered.   I have also put together some discharge instructions for them that include: 1) educational information regarding their diagnosis, 2) how to care for their diagnosis at home, as well a 3) list of reasons why they would want to return to the ED prior to their follow-up appointment, should their condition change. Discharged    DISCHARGE PLAN:  1. There are no discharge medications for this patient. 2.   Follow-up Information     Follow up With Specialties Details Why Contact Jenniffer Jhaveri DPM Podiatry Schedule an appointment as soon as possible for a visit in 1 day  [address] [facility] [id number] [phone]      [address]  In 1 day  [address]  [place] [address] [facility]  In 1 day  [address] [facility] [address]  [phone]    [facility]  In 1 day  [address] [facility] [address]        3. Return to ED if worse   4. Current Discharge Medication List      START taking these medications    Details   bacitracin (BACITRACIN) 500 unit/gram oint Apply  to affected area three (3) times daily for 10 days. Apply to affected area  Qty: 1 Tube, Refills: 0      hydrocortisone (CORTIZONE) 0.5 % ointment Apply 1 g to affected area two (2) times a day for 10 days. Apply to affected area  Qty: 1 Tube, Refills: 0               Diagnosis     Clinical Impression:   1. Blister of toe of left foot without infection, initial encounter    2. Dermatitis        Attestations:    Sybil Saunders, DO    Please note that this dictation was completed with [employer]ie, the computer voice recognition software. Quite often unanticipated grammatical, syntax, homophones, and other interpretive errors are inadvertently transcribed by the computer software. Please disregard these errors. Please excuse any errors that have escaped final proofreading. Thank you.

## 2021-04-04 ENCOUNTER — HOSPITAL ENCOUNTER (EMERGENCY)
Age: 56
Discharge: HOME | End: 2021-04-04
Admitting: EMERGENCY MEDICINE
Payer: COMMERCIAL

## 2021-04-04 VITALS
BODY MASS INDEX: 31.24 KG/M2 | DIASTOLIC BLOOD PRESSURE: 74 MMHG | HEART RATE: 68 BPM | TEMPERATURE: 97.8 F | HEIGHT: 64 IN | RESPIRATION RATE: 16 BRPM | WEIGHT: 183 LBS | SYSTOLIC BLOOD PRESSURE: 118 MMHG | OXYGEN SATURATION: 99 %

## 2021-04-04 DIAGNOSIS — L50.9 HIVES: Primary | ICD-10-CM

## 2021-04-04 PROCEDURE — 74011250637 HC RX REV CODE- 250/637: Performed by: PHYSICIAN ASSISTANT

## 2021-04-04 PROCEDURE — 96374 THER/PROPH/DIAG INJ IV PUSH: CPT

## 2021-04-04 PROCEDURE — 74011250636 HC RX REV CODE- 250/636: Performed by: PHYSICIAN ASSISTANT

## 2021-04-04 PROCEDURE — 99283 EMERGENCY DEPT VISIT LOW MDM: CPT

## 2021-04-04 RX ORDER — FAMOTIDINE 20 MG/1
20 TABLET, FILM COATED ORAL DAILY
Qty: 10 TAB | Refills: 0 | Status: SHIPPED | OUTPATIENT
Start: 2021-04-04 | End: 2021-04-14

## 2021-04-04 RX ORDER — DIPHENHYDRAMINE HCL 25 MG
25 CAPSULE ORAL
Status: COMPLETED | OUTPATIENT
Start: 2021-04-04 | End: 2021-04-04

## 2021-04-04 RX ORDER — DEXAMETHASONE SODIUM PHOSPHATE 10 MG/ML
10 INJECTION INTRAMUSCULAR; INTRAVENOUS ONCE
Status: COMPLETED | OUTPATIENT
Start: 2021-04-04 | End: 2021-04-04

## 2021-04-04 RX ORDER — PREDNISONE 20 MG/1
20 TABLET ORAL DAILY
Qty: 10 TAB | Refills: 0 | Status: SHIPPED | OUTPATIENT
Start: 2021-04-04 | End: 2021-04-06 | Stop reason: ALTCHOICE

## 2021-04-04 RX ORDER — FAMOTIDINE 20 MG/1
20 TABLET, FILM COATED ORAL
Status: COMPLETED | OUTPATIENT
Start: 2021-04-04 | End: 2021-04-04

## 2021-04-04 RX ADMIN — FAMOTIDINE 20 MG: 20 TABLET, FILM COATED ORAL at 12:47

## 2021-04-04 RX ADMIN — DIPHENHYDRAMINE HYDROCHLORIDE 25 MG: 25 CAPSULE ORAL at 12:47

## 2021-04-04 RX ADMIN — DEXAMETHASONE SODIUM PHOSPHATE 10 MG: 10 INJECTION INTRAMUSCULAR; INTRAVENOUS at 12:47

## 2021-04-04 NOTE — ED PROVIDER NOTES
EMERGENCY DEPARTMENT HISTORY AND PHYSICAL EXAM      Date: 2021  Patient Name: Lissette Zamora    History of Presenting Illness     Chief Complaint   Patient presents with    Allergic Reaction       History Provided By: Patient    HPI: Lissette Zamora, 48 y.o. female with a past medical history significant  of juliette johnsons syndrome who presents to the ED with cc of hives on bilateral arms since yesterday. Pt denies sob,difficulty swollowing or chest pain. There are no other complaints, changes, or physical findings at this time. PCP: Darrell Longoria MD    No current facility-administered medications on file prior to encounter. Current Outpatient Medications on File Prior to Encounter   Medication Sig Dispense Refill    bacitracin (BACITRACIN) 500 unit/gram oint Apply  to affected area three (3) times daily for 10 days. Apply to affected area 1 Tube 0    hydrocortisone (CORTIZONE) 0.5 % ointment Apply 1 g to affected area two (2) times a day for 10 days. 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[facility])        Past Surgical History:  Past Surgical History:   Procedure Laterality Date    COLONOSCOPY N/A 2017    COL[facility] Rodgers [facility] ENDOSCOPY    HX  SECTION      HX GYN Right     lumpectomy    HX OOPHORECTOMY      partial        Family History:  Family History   Problem Relation Age of Onset    Heart Disease Father        Social History:  Social History     Tobacco Use    Smoking status: Never Smoker    Smokeless tobacco: Never Used   Substance Use Topics    Alcohol use: No    Drug use: Never       Allergies:   Allergies   Allergen Reactions    Bactrim [Sulfamethoprim] Rash    Nut Flavor Anaphylaxis    Nuts [Tree Nut] Anaphylaxis    Cafergot [Ergotamine-Caffeine] Nausea Only         Review of Systems     Review of Systems Constitutional: Negative. HENT: Negative. Eyes: Negative. Respiratory: Negative. Cardiovascular: Negative. Gastrointestinal: Negative. Endocrine: Negative. Genitourinary: Negative. Musculoskeletal: Negative. Skin: Positive for rash. Allergic/Immunologic: Negative. Neurological: Negative. Hematological: Negative. Psychiatric/Behavioral: Negative. Physical Exam     Physical Exam  Vitals signs and nursing note reviewed. Constitutional:       Appearance: Normal appearance. She is normal weight. HENT:      Mouth/Throat:      Mouth: Mucous membranes are dry. Pharynx: Oropharynx is clear. Eyes:      Extraocular Movements: Extraocular movements intact. Conjunctiva/sclera: Conjunctivae normal.      Pupils: Pupils are equal, round, and reactive to light. Neck:      Musculoskeletal: Normal range of motion and neck supple. Cardiovascular:      Rate and Rhythm: Normal rate and regular rhythm. Pulmonary:      Effort: Pulmonary effort is normal.      Breath sounds: Normal breath sounds. Skin:     Comments: + hives bilat upper extremity   Neurological:      Mental Status: She is alert. Lab and Diagnostic Study Results     Labs -   No results found for this or any previous visit (from the past 12 hour(s)). Radiologic Studies -   @lastxrresult@  CT Results  (Last 48 hours)    None        CXR Results  (Last 48 hours)    None            Medical Decision Making   - I am the first provider for this patient. - I reviewed the vital signs, available nursing notes, past medical history, past surgical history, family history and social history. - Initial assessment performed. The patients presenting problems have been discussed, and they are in agreement with the care plan formulated and outlined with them. I have encouraged them to ask questions as they arise throughout their visit. Vital Signs-Reviewed the patient's vital signs.   Patient Vitals for the past 12 hrs:   Temp Pulse Resp BP SpO2   04/04/21 1309  68 16 118/74 99 %   04/04/21 1211 97.8 °F (36.6 °C) 67 16  100 %       Records Reviewed: Old Medical Records    The patient presents  with a differential diagnosis of allergic rx,hives      ED Course:          Provider Notes (Medical Decision Making): MDM       Procedures   Medical Decision Makingedical Decision Making  Performed by: MALINDA Bradley  PROCEDURES:  Procedures       Disposition   Disposition: Condition improved    Discharged    DISCHARGE PLAN:  1. Current Discharge Medication List      CONTINUE these medications which have NOT CHANGED    Details   bacitracin (BACITRACIN) 500 unit/gram oint Apply  to affected area three (3) times daily for 10 days. Apply to affected area  Qty: 1 Tube, Refills: 0      hydrocortisone (CORTIZONE) 0.5 % ointment Apply 1 g to affected area two (2) times a day for 10 days. Apply to affected area  Qty: 1 Tube, Refills: 0           2. Follow-up Information     Follow up With Specialties Details Why Amrita Samson MD Internal Medicine In 3 days As needed, If symptoms worsen [address] [facility] [phone]          3. Return to ED if worse   4. Current Discharge Medication List            Diagnosis     Clinical Impression:   1. Hives        Attestations:    MALINDA Elmore    Please note that this dictation was completed with EZDOCTOR, the computer voice recognition software. Quite often unanticipated grammatical, syntax, homophones, and other interpretive errors are inadvertently transcribed by the computer software. Please disregard these errors. Please excuse any errors that have escaped final proofreading. Thank you.

## 2021-04-04 NOTE — ED TRIAGE NOTES
Series of hives onset a few days, concerned re systemic reaction . Seen at free standing , no changes. denies sob. No diff swallowing.

## 2021-04-06 ENCOUNTER — OFFICE VISIT (OUTPATIENT)
Dept: INTERNAL MEDICINE CLINIC | Age: 56
End: 2021-04-06
Payer: COMMERCIAL

## 2021-04-06 VITALS
TEMPERATURE: 98.7 F | OXYGEN SATURATION: 98 % | HEIGHT: 64 IN | HEART RATE: 78 BPM | WEIGHT: 185 LBS | RESPIRATION RATE: 16 BRPM | SYSTOLIC BLOOD PRESSURE: 126 MMHG | BODY MASS INDEX: 31.58 KG/M2 | DIASTOLIC BLOOD PRESSURE: 82 MMHG

## 2021-04-06 DIAGNOSIS — L50.9 URTICARIA: Primary | ICD-10-CM

## 2021-04-06 DIAGNOSIS — T63.301A ALLERGIC REACTION TO SPIDER BITE, ACCIDENTAL OR UNINTENTIONAL, INITIAL ENCOUNTER: ICD-10-CM

## 2021-04-06 PROCEDURE — 99213 OFFICE O/P EST LOW 20 MIN: CPT | Performed by: INTERNAL MEDICINE

## 2021-04-06 RX ORDER — PREDNISONE 10 MG/1
TABLET ORAL
Qty: 21 TAB | Refills: 0 | Status: SHIPPED | OUTPATIENT
Start: 2021-04-06 | End: 2021-10-08 | Stop reason: ALTCHOICE

## 2021-04-06 NOTE — PATIENT INSTRUCTIONS
Probe [employer] Activation Thank you for requesting access to HexAirbot. Please follow the instructions below to securely access and download your online medical record. HexAirbot allows you to send messages to your doctor, view your test results, renew your prescriptions, schedule appointments, and more. How Do I Sign Up? 1. In your internet browser, go to [email or web address] 
2. Click on the First Time User? Click Here link in the Sign In box. You will be redirect to the New Member Sign Up page. 3. Enter your HexAirbot Access Code exactly as it appears below. You will not need to use this code after youve completed the sign-up process. If you do not sign up before the expiration date, you must request a new code. HexAirbot Access Code: Activation code not generated Current HexAirbot Status: Active (This is the date your HexAirbot access code will ) 4. Enter the last four digits of your Social Security Number (xxxx) and Date of Birth (mm/dd/yyyy) as indicated and click Submit. You will be taken to the next sign-up page. 5. Create a HexAirbot ID. This will be your HexAirbot login ID and cannot be changed, so think of one that is secure and easy to remember. 6. Create a HexAirbot password. You can change your password at any time. 7. Enter your Password Reset Question and Answer. This can be used at a later time if you forget your password. 8. Enter your e-mail address. You will receive e-mail notification when new information is available in [address] 9. Click Sign Up. You can now view and download portions of your medical record. 10. Click the Download Summary menu link to download a portable copy of your medical information. Additional Information If you have questions, please visit the Frequently Asked Questions section of the HexAirbot website at [email or web address] Remember, HexAirbot is NOT to be used for urgent needs. For medical emergencies, dial 911.

## 2021-04-06 NOTE — PROGRESS NOTES
Brea Hurt is a 48 y.o. female and presents with Insect Bite and Allergic Reaction  . Subjective:  Brea Hurt is a 48 y.o. female seen in consultation for evaluation of chronic urticaria. Patient's symptoms include skin rash and urticaria. Hives are described as a red, raised and itchy skin rash that occurs on the neck and arms. The patient has had these symptoms for 3 days. Possible triggers include unknown. Each individual hive lasts greater than 24 hours. These lesions are pruritic and not painful. They heal without scarring. The patient has tried the following medications for control of these symptoms: over-the-counter antihistamines, topical steroids and oral steroids. These medications offer fair relief of symptoms. Associated symptoms include no concurrent angioedema or anaphylaxis. There has not been laryngeal/throat involvement. The patient has not required Emergency Room evaluation and treatment for these symptoms. Skin biopsy has not been performed. Family Atopy History: no history of atopy   Environmental History: unknown      She has scaling between the toes. Review of Systems  Constitutional: negative for fevers, chills, anorexia and weight loss  Eyes:   negative for visual disturbance and irritation  ENT:   negative for tinnitus,sore throat,nasal congestion,ear pains. hoarseness  Respiratory:  negative for cough, hemoptysis, dyspnea,wheezing  CV:   negative for chest pain, palpitations, lower extremity edema  GI:   negative for nausea, vomiting, diarrhea, abdominal pain,melena  Endo:               negative for polyuria,polydipsia,polyphagia,heat intolerance  Genitourinary: negative for frequency, dysuria and hematuria  Integument:  rash and pruritus  Hematologic:  negative for easy bruising and gum/nose bleeding  Musculoskel: negative for myalgias, arthralgias, back pain, muscle weakness, joint pain  Neurological:  negative for headaches, dizziness, vertigo, memory problems and gait Behavl/Psych: negative for feelings of anxiety, depression, mood changes    Past Medical History:   Diagnosis Date    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[facility] [address]      Past Surgical History:   Procedure Laterality Date    COLONOSCOPY N/A 2017    COLONOSCOPY performed by David Amaya MD at [facility] ENDOSCOPY    HX  SECTION      HX GYN Right     lumpectomy    HX OOPHORECTOMY      partial      Social History     Socioeconomic History    Marital status: SINGLE     Spouse name: Not on file    Number of children: Not on file    Years of education: Not on file    Highest education level: Not on file   Tobacco Use    Smoking status: Never Smoker    Smokeless tobacco: Never Used   Substance and Sexual Activity    Alcohol use: No    Drug use: Never     Family History   Problem Relation Age of Onset    Heart Disease Father      Current Outpatient Medications   Medication Sig Dispense Refill    predniSONE (DELTASONE) 10 mg tablet 6 tabs today and reduce by 1 tab daily 21 Tab 0    famotidine (Pepcid) 20 mg tablet Take 1 Tab by mouth daily for 10 days. 10 Tab 0    hydrocortisone (CORTIZONE) 0.5 % ointment Apply 1 g to affected area two (2) times a day for 10 days.  Apply to affected area 1 Tube 0     Allergies   Allergen Reactions    Bactrim [Sulfamethoprim] Rash    Nut Flavor Anaphylaxis    Nuts [Tree Nut] Anaphylaxis    Cafergot [Ergotamine-Caffeine] Nausea Only       Objective:  Visit Vitals  /82   Pulse 78   Temp 98.7 °F (37.1 °C) (Oral)   Resp 16   Ht 5' 4\" (1.626 m)   Wt 185 lb (83.9 kg)   SpO2 98%   BMI 31.76 kg/m²     Physical Exam:   General appearance - alert, well appearing, and in no distress  Mental status - alert, oriented to person, place, and time  EYE-MIRA, EOMI, corneas normal, no foreign bodies  ENT-ENT exam normal, no neck nodes or sinus tenderness  Nose - normal and patent, no erythema, discharge or polyps  Mouth - mucous membranes moist, pharynx normal without lesions  Neck - supple, no significant adenopathy   Chest - clear to auscultation, no wheezes, rales or rhonchi, symmetric air entry   Heart - normal rate, regular rhythm, normal S1, S2, no murmurs, rubs, clicks or gallops   Abdomen - soft, nontender, nondistended, no masses or organomegaly  Lymph- no adenopathy palpable  Ext-peripheral pulses normal, no pedal edema, no clubbing or cyanosis  Skin-Maculopapular eruptions  Neuro -alert, oriented, normal speech, no focal findings or movement disorder noted  Neck-normal C-spine, no tenderness, full ROM without pain  Feet-no nail deformities or callus formation with good pulses noted      Results for orders placed or performed in visit on 21   NOVEL CORONAVIRUS (COVID-19)   Result Value Ref Range    SARS-CoV-2, RAMOS Not Detected        Assessment/Plan:    ICD-10-CM ICD-9-CM    1. Urticaria  L50.9 708.9    2. Allergic reaction to spider bite, accidental or unintentional, initial encounter  T63.301A 989.5      E905.1      Orders Placed This Encounter    predniSONE (DELTASONE) 10 mg tablet     Si tabs today and reduce by 1 tab daily     Dispense:  21 Tab     Refill:  0     lose weight, increase physical activity, follow low fat diet, follow low salt diet, continue present plan,Take 81mg aspirin daily  Patient Instructions   MyChart Activation    Thank you for requesting access to Hostmonster. Please follow the instructions below to securely access and download your online medical record. Hostmonster allows you to send messages to your doctor, view your test results, renew your prescriptions, schedule appointments, and more. How Do I Sign Up? 1. In your internet browser, go to [email or web address]  2. Click on the First Time User? Click Here link in the Sign In box. You will be redirect to the New Member Sign Up page. 3. Enter your Hostmonster Access Code exactly as it appears below. You will not need to use this code after youve completed the sign-up process.  If you do not sign up before the expiration date, you must request a new code. [email or web address] Access Code: Activation code not generated  Current [email or web address] Status: Active (This is the date your [email or web address] access code will )    4. Enter the last four digits of your Social Security Number (xxxx) and Date of Birth (mm/dd/yyyy) as indicated and click Submit. You will be taken to the next sign-up page. 5. Create a AptDecot ID. This will be your [email or web address] login ID and cannot be changed, so think of one that is secure and easy to remember. 6. Create a AptDecot password. You can change your password at any time. 7. Enter your Password Reset Question and Answer. This can be used at a later time if you forget your password. 8. Enter your e-mail address. You will receive e-mail notification when new information is available in [address] 9. Click Sign Up. You can now view and download portions of your medical record. 10. Click the Download Summary menu link to download a portable copy of your medical information. Additional Information    If you have questions, please visit the Frequently Asked Questions section of the [email or web address] website at [email or web address] Remember, [email or web address] is NOT to be used for urgent needs. For medical emergencies, dial 911. I have reviewed with the patient details of the assessment and plan and all questions were answered. Relevent patient education was performed. The most recent lab findings were reviewed with the patient. An After Visit Summary was printed and given to the patient.

## 2021-04-06 NOTE — PROGRESS NOTES
1. Have you been to the ER, urgent care clinic since your last visit? Hospitalized since your last visit?no    2. Have you seen or consulted any other health care providers outside of the [address] [place] [address] since your last visit? Include any pap smears or colon screening. No    Chief Complaint   Patient presents with    Insect Bite    Allergic Reaction     Per Dr. Charlotte Knapp.,  verbal order given for needed amb poc labs.

## 2021-10-08 ENCOUNTER — OFFICE VISIT (OUTPATIENT)
Dept: INTERNAL MEDICINE CLINIC | Age: 56
End: 2021-10-08
Payer: COMMERCIAL

## 2021-10-08 VITALS
HEIGHT: 64 IN | SYSTOLIC BLOOD PRESSURE: 130 MMHG | BODY MASS INDEX: 29.02 KG/M2 | WEIGHT: 170 LBS | DIASTOLIC BLOOD PRESSURE: 90 MMHG | OXYGEN SATURATION: 97 % | HEART RATE: 56 BPM | TEMPERATURE: 97.8 F | RESPIRATION RATE: 16 BRPM

## 2021-10-08 DIAGNOSIS — I10 LABILE ESSENTIAL HYPERTENSION: Primary | ICD-10-CM

## 2021-10-08 DIAGNOSIS — Z86.16 HISTORY OF COVID-19: ICD-10-CM

## 2021-10-08 DIAGNOSIS — Z12.31 ENCOUNTER FOR SCREENING MAMMOGRAM FOR MALIGNANT NEOPLASM OF BREAST: ICD-10-CM

## 2021-10-08 DIAGNOSIS — Z11.59 NEED FOR HEPATITIS C SCREENING TEST: ICD-10-CM

## 2021-10-08 LAB
ANION GAP SERPL CALC-SCNC: 4 MMOL/L (ref 5–15)
BUN SERPL-MCNC: 15 MG/DL (ref 6–20)
BUN/CREAT SERPL: 17 (ref 12–20)
CALCIUM SERPL-MCNC: 9.2 MG/DL (ref 8.5–10.1)
CHLORIDE SERPL-SCNC: 107 MMOL/L (ref 97–108)
CHOLEST SERPL-MCNC: 155 MG/DL
CO2 SERPL-SCNC: 27 MMOL/L (ref 21–32)
CREAT SERPL-MCNC: 0.87 MG/DL (ref 0.55–1.02)
ERYTHROCYTE [DISTWIDTH] IN BLOOD BY AUTOMATED COUNT: 15.5 % (ref 11.5–14.5)
GLUCOSE SERPL-MCNC: 94 MG/DL (ref 65–100)
HCT VFR BLD AUTO: 44.9 % (ref 35–47)
HCV AB SERPL QL IA: NONREACTIVE
HDLC SERPL-MCNC: 49 MG/DL
HGB BLD-MCNC: 13.7 G/DL (ref 11.5–16)
LDL CHOLESTEROL POC: 94 MG/DL
MCH RBC QN AUTO: 26.9 PG (ref 26–34)
MCHC RBC AUTO-ENTMCNC: 30.5 G/DL (ref 30–36.5)
MCV RBC AUTO: 88 FL (ref 80–99)
NON-HDL CHOLESTEROL, 011976: 106
NRBC # BLD: 0 K/UL (ref 0–0.01)
NRBC BLD-RTO: 0 PER 100 WBC
PLATELET # BLD AUTO: 267 K/UL (ref 150–400)
PMV BLD AUTO: 12.5 FL (ref 8.9–12.9)
POTASSIUM SERPL-SCNC: 4.6 MMOL/L (ref 3.5–5.1)
RBC # BLD AUTO: 5.1 M/UL (ref 3.8–5.2)
SODIUM SERPL-SCNC: 138 MMOL/L (ref 136–145)
TCHOL/HDL RATIO (POC): 3.2
TRIGL SERPL-MCNC: 62 MG/DL
WBC # BLD AUTO: 5.5 K/UL (ref 3.6–11)

## 2021-10-08 PROCEDURE — 99213 OFFICE O/P EST LOW 20 MIN: CPT | Performed by: INTERNAL MEDICINE

## 2021-10-08 PROCEDURE — 80061 LIPID PANEL: CPT | Performed by: INTERNAL MEDICINE

## 2021-10-08 NOTE — PROGRESS NOTES
Anuj Ballesteros is a 48 y.o. female and presents with Post-COVID Symptoms  . Subjective:  She is is s/p COVID  She has reported some shortness of breath. Review of Systems  Constitutional: negative for fevers, chills, anorexia and weight loss  Eyes:   negative for visual disturbance and irritation  ENT:   negative for tinnitus,sore throat,nasal congestion,ear pains. hoarseness  Respiratory:  negative for cough, hemoptysis, dyspnea,wheezing,chest congestion  CV:   negative for chest pain, palpitations, lower extremity edema  GI:   negative for nausea, vomiting, diarrhea, abdominal pain,melena  Endo:               negative for polyuria,polydipsia,polyphagia,heat intolerance  Genitourinary: negative for frequency, dysuria and hematuria  Integument:  negative for rash and pruritus  Hematologic:  negative for easy bruising and gum/nose bleeding  Musculoskel: negative for myalgias, arthralgias, back pain, muscle weakness, joint pain  Neurological:  negative for headaches, dizziness, vertigo, memory problems and gait   Behavl/Psych: negative for feelings of anxiety, depression, mood changes    Past Medical History:   Diagnosis Date    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      Past Surgical History:   Procedure Laterality Date    COLONOSCOPY N/A 2017    COLONOSCOPY performed by Anola Fleischer, MD at [facility]"" ENDOSCOPY    HX  SECTION      HX GYN Right     lumpectomy    HX OOPHORECTOMY      partial      Social History     Socioeconomic History    Marital status: SINGLE     Spouse name: Not on file    Number of children: Not on file    Years of education: Not on file    Highest education level: Not on file   Tobacco Use    Smoking status: Never Smoker    Smokeless tobacco: Never Used   Vaping Use    Vaping Use: Never used   Substance and Sexual Activity    Alcohol use: No    Drug use: Never     Social Determinants of Health     Financial Resource Strain:     Difficulty of Paying Living Expenses: Food Insecurity:     Worried About Running Out of Food in the Last Year:     [address] in the Last Year:    Transportation Needs:     Lack of Transportation (Medical):  Lack of Transportation (Non-Medical):    Physical Activity:     Days of Exercise per Week:     Minutes of Exercise per Session:    Stress:     Feeling of Stress :    Social Connections:     Frequency of Communication with Friends and Family:     Frequency of Social Gatherings with Friends and Family:     Attends [employer] Services:     Active Member of Clubs or Organizations:     Attends Club or Organization Meetings:     Marital Status:      Family History   Problem Relation Age of Onset    Heart Disease Father        Allergies   Allergen Reactions    Bactrim [Sulfamethoprim] Rash    Nut Flavor Anaphylaxis    Nuts [Tree Nut] Anaphylaxis    Cafergot [Ergotamine-Caffeine] Nausea Only       Objective:  Visit Vitals  BP (!) 130/90   Pulse (!) 56   Temp 97.8 °F (36.6 °C) (Oral)   Resp 16   Ht 5' 4\" (1.626 m)   Wt 170 lb (77.1 kg)   SpO2 97%   BMI 29.18 kg/m²     Physical Exam:   General appearance - alert, well appearing, and in no distress  Mental status - alert, oriented to person, place, and time  EYE-MIRA, EOMI, corneas normal, no foreign bodies  ENT-ENT exam normal, no neck nodes or sinus tenderness  Nose - normal and patent, no erythema, discharge or polyps  Mouth - mucous membranes moist, pharynx normal without lesions  Neck - supple, no significant adenopathy   Chest - clear to auscultation, no wheezes, rales or rhonchi, symmetric air entry   Heart - normal rate, regular rhythm, normal S1, S2, no murmurs, rubs, clicks or gallops   Abdomen - soft, nontender, nondistended, no masses or organomegaly  Lymph- no adenopathy palpable  Ext-peripheral pulses normal, no pedal edema, no clubbing or cyanosis  Skin-Warm and dry.  no hyperpigmentation, vitiligo, or suspicious lesions  Neuro -alert, oriented, normal speech, no focal findings or movement disorder noted  Neck-normal C-spine, no tenderness, full ROM without pain  Feet-no nail deformities or callus formation with good pulses noted      Results for orders placed or performed in visit on 03/25/21   NOVEL CORONAVIRUS (COVID-19)   Result Value Ref Range    SARS-CoV-2, RAMOS Not Detected        Assessment/Plan:    ICD-10-CM ICD-9-CM    1. Encounter for screening mammogram for malignant neoplasm of breast  Z12.31 V76.12 KIM MAMMO BI SCREENING INCL CAD   2. Labile essential hypertension  I10 401.9 AMB POC LIPID PROFILE      CBC W/O DIFF      METABOLIC PANEL, BASIC   3. Need for hepatitis C screening test  Z11.59 V73.89 HEPATITIS C AB     Orders Placed This Encounter    KIM MAMMO BI SCREENING INCL CAD     Standing Status:   Future     Standing Expiration Date:   11/8/2021     Order Specific Question:   Reason for Exam     Answer:   breast cancer screening    CBC W/O DIFF     Standing Status:   Future     Standing Expiration Date:   22/3/4069    METABOLIC PANEL, BASIC     Standing Status:   Future     Standing Expiration Date:   10/8/2022    HEPATITIS C AB     Standing Status:   Future     Standing Expiration Date:   10/8/2022    AMB POC LIPID PROFILE     call if any problems,Take 81mg aspirin daily  Patient Instructions   Jogg Activation    Thank you for requesting access to Jogg. Please follow the instructions below to securely access and download your online medical record. Jogg allows you to send messages to your doctor, view your test results, renew your prescriptions, schedule appointments, and more. How Do I Sign Up? 1. In your internet browser, go to [email or web address]  2. Click on the First Time User? Click Here link in the Sign In box. You will be redirect to the New Member Sign Up page. 3. Enter your Jogg Access Code exactly as it appears below. You will not need to use this code after youve completed the sign-up process.  If you do not sign up before the expiration date, you must request a new code. Sting Communications Access Code: Activation code not generated  Current Sting Communications Status: Active (This is the date your Sting Communications access code will )    4. Enter the last four digits of your Social Security Number (xxxx) and Date of Birth (mm/dd/yyyy) as indicated and click Submit. You will be taken to the next sign-up page. 5. Create a Smavat ID. This will be your Sting Communications login ID and cannot be changed, so think of one that is secure and easy to remember. 6. Create a Smavat password. You can change your password at any time. 7. Enter your Password Reset Question and Answer. This can be used at a later time if you forget your password. 8. Enter your e-mail address. You will receive e-mail notification when new information is available in [address] 9. Click Sign Up. You can now view and download portions of your medical record. 10. Click the Download Summary menu link to download a portable copy of your medical information. Additional Information    If you have questions, please visit the Frequently Asked Questions section of the Sting Communications website at [email or web address] Remember, St[employer] Communications is NOT to be used for urgent needs. For medical emergencies, dial 911. Follow-up and Dispositions    · Return in about 3 months (around 2022), or if symptoms worsen or fail to improve. I have reviewed with the patient details of the assessment and plan and all questions were answered. Relevent patient education was performed. The most recent lab findings were reviewed with the patient. An After Visit Summary was printed and given to the patient.

## 2021-10-08 NOTE — PATIENT INSTRUCTIONS
[employer] Activation    Thank you for requesting access to [employer]. Please follow the instructions below to securely access and download your online medical record. [employer] allows you to send messages to your doctor, view your test results, renew your prescriptions, schedule appointments, and more. How Do I Sign Up? 1. In your internet browser, go to [email or web address]  2. Click on the First Time User? Click Here link in the Sign In box. You will be redirect to the New Member Sign Up page. 3. Enter your [employer] Access Code exactly as it appears below. You will not need to use this code after youve completed the sign-up process. If you do not sign up before the expiration date, you must request a new code. [employer] Access Code: Activation code not generated  Current [employer] Status: Active (This is the date your [employer] access code will )    4. Enter the last four digits of your Social Security Number (xxxx) and Date of Birth (mm/dd/yyyy) as indicated and click Submit. You will be taken to the next sign-up page. 5. Create a [employer] ID. This will be your [employer] login ID and cannot be changed, so think of one that is secure and easy to remember. 6. Create a [employer] password. You can change your password at any time. 7. Enter your Password Reset Question and Answer. This can be used at a later time if you forget your password. 8. Enter your e-mail address. You will receive e-mail notification when new information is available in [address] 9. Click Sign Up. You can now view and download portions of your medical record. 10. Click the Download Summary menu link to download a portable copy of your medical information. Additional Information    If you have questions, please visit the Frequently Asked Questions section of the [employer] website at [email or web address] Remember, [employer] is NOT to be used for urgent needs. For medical emergencies, dial 911.

## 2021-10-08 NOTE — LETTER
10/8/2021 9:45 AM    [facility] Paul [facility]  [address] [facility] [address]    The above patient had a recent Covid infection.   She does not need vaccination at this time          Sincerely,      Lauren Razo MD

## 2021-10-08 NOTE — PROGRESS NOTES
1. Have you been to the ER, urgent care clinic since your last visit? Hospitalized since your last visit?no    2. Have you seen or consulted any other health care providers outside of the [address] [place] [address] since your last visit? Include any pap smears or colon screening. No      Per Dr. Duglas Guerrero.,  verbal order given for needed amb poc labs.   3 most recent PHQ Screens 10/8/2021   PHQ Not Done -   Little interest or pleasure in doing things Not at all   Feeling down, depressed, irritable, or hopeless Not at all   Total Score PHQ 2 0       Chief Complaint   Patient presents with    Post-COVID Symptoms

## 2021-10-18 ENCOUNTER — TRANSCRIBE ORDER (OUTPATIENT)
Dept: SCHEDULING | Age: 56
End: 2021-10-18

## 2021-10-18 DIAGNOSIS — Z12.31 SCREENING MAMMOGRAM FOR HIGH-RISK PATIENT: Primary | ICD-10-CM

## 2021-10-22 ENCOUNTER — HOSPITAL ENCOUNTER (OUTPATIENT)
Dept: MAMMOGRAPHY | Age: 56
Discharge: HOME | End: 2021-10-22
Attending: INTERNAL MEDICINE
Payer: COMMERCIAL

## 2021-10-22 DIAGNOSIS — Z12.31 ENCOUNTER FOR SCREENING MAMMOGRAM FOR MALIGNANT NEOPLASM OF BREAST: ICD-10-CM

## 2021-10-22 PROCEDURE — 77067 SCR MAMMO BI INCL CAD: CPT

## 2022-01-11 RX ORDER — TRIAMCINOLONE ACETONIDE 1 MG/G
CREAM TOPICAL
Qty: 80 G | Refills: 3 | Status: SHIPPED | OUTPATIENT
Start: 2022-01-11

## 2022-01-11 RX ORDER — PREDNISONE 10 MG/1
TABLET ORAL
Qty: 21 TABLET | Refills: 0 | Status: SHIPPED | OUTPATIENT
Start: 2022-01-11

## 2022-01-19 ENCOUNTER — OFFICE VISIT (OUTPATIENT)
Dept: INTERNAL MEDICINE CLINIC | Age: 57
End: 2022-01-19
Payer: COMMERCIAL

## 2022-01-19 VITALS
OXYGEN SATURATION: 98 % | HEART RATE: 83 BPM | RESPIRATION RATE: 20 BRPM | TEMPERATURE: 98.2 F | BODY MASS INDEX: 31.76 KG/M2 | HEIGHT: 64 IN | SYSTOLIC BLOOD PRESSURE: 120 MMHG | DIASTOLIC BLOOD PRESSURE: 70 MMHG | WEIGHT: 186 LBS

## 2022-01-19 DIAGNOSIS — L42 PITYRIASIS ROSEA: Primary | ICD-10-CM

## 2022-01-19 PROCEDURE — 99213 OFFICE O/P EST LOW 20 MIN: CPT | Performed by: INTERNAL MEDICINE

## 2022-01-19 NOTE — PATIENT INSTRUCTIONS
[employer] Activation    Thank you for requesting access to [employer]. Please follow the instructions below to securely access and download your online medical record. [employer] allows you to send messages to your doctor, view your test results, renew your prescriptions, schedule appointments, and more. How Do I Sign Up? 1. In your internet browser, go to [email or web address]  2. Click on the First Time User? Click Here link in the Sign In box. You will be redirect to the New Member Sign Up page. 3. Enter your [employer] Access Code exactly as it appears below. You will not need to use this code after youve completed the sign-up process. If you do not sign up before the expiration date, you must request a new code. [employer] Access Code: Activation code not generated  Current [employer] Status: Active (This is the date your [employer] access code will )    4. Enter the last four digits of your Social Security Number (xxxx) and Date of Birth (mm/dd/yyyy) as indicated and click Submit. You will be taken to the next sign-up page. 5. Create a [employer] ID. This will be your [employer] login ID and cannot be changed, so think of one that is secure and easy to remember. 6. Create a [employer] password. You can change your password at any time. 7. Enter your Password Reset Question and Answer. This can be used at a later time if you forget your password. 8. Enter your e-mail address. You will receive e-mail notification when new information is available in [address] 9. Click Sign Up. You can now view and download portions of your medical record. 10. Click the Download Summary menu link to download a portable copy of your medical information. Additional Information    If you have questions, please visit the Frequently Asked Questions section of the [employer] website at [email or web address] Remember, [employer] is NOT to be used for urgent needs. For medical emergencies, dial 911.

## 2022-01-19 NOTE — PROGRESS NOTES
Kayce Richards is a 55 y.o. female and presents with Rash  . Subjective:  She states she has a long history of pityriasis rosea  She was recently placed on steriods and a steroid cream          Review of Systems  Constitutional: negative for fevers, chills, anorexia and weight loss  Eyes:   negative for visual disturbance and irritation  ENT:   negative for tinnitus,sore throat,nasal congestion,ear pains. hoarseness  Respiratory:  negative for cough, hemoptysis, dyspnea,wheezing  CV:   negative for chest pain, palpitations, lower extremity edema  GI:   negative for nausea, vomiting, diarrhea, abdominal pain,melena  Endo:               negative for polyuria,polydipsia,polyphagia,heat intolerance  Genitourinary: negative for frequency, dysuria and hematuria  Integument:  rash and pruritus  Hematologic:  negative for easy bruising and gum/nose bleeding  Musculoskel: negative for myalgias, arthralgias, back pain, muscle weakness, joint pain  Neurological:  negative for headaches, dizziness, vertigo, memory problems and gait   Behavl/Psych: negative for feelings of anxiety, depression, mood changes    Past Medical History:   Diagnosis Date    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      Past Surgical History:   Procedure Laterality Date    COLONOSCOPY N/A 2017    [facility]COPY    HX  SECTION      HX GYN Right     lumpectomy    HX OOPHORECTOMY      partial      Social History     Socioeconomic History    Marital status: SINGLE   Tobacco Use    Smoking status: Never Smoker    Smokeless tobacco: Never Used   Vaping Use    Vaping Use: Never used   Substance and Sexual Activity    Alcohol use: No    Drug use: Never     Family History   Problem Relation Age of Onset    Heart Disease Father     Breast Cancer Maternal Grandmother 48     Current Outpatient Medications   Medication Sig Dispense Refill    predniSONE (DELTASONE) 10 mg tablet 6 tabs today and reduce by 1 tab daily 21 Tablet 0    triamcinolone acetonide (KENALOG) 0.1 % topical cream apply topically to affected area twice a day UNTIL RESOLVED 80 g 3     Allergies   Allergen Reactions    Bactrim [Sulfamethoprim] Rash    Nut Flavor Anaphylaxis    Nuts [Tree Nut] Anaphylaxis    Cafergot [Ergotamine-Caffeine] Nausea Only       Objective:  Visit Vitals  /70 (BP 1 Location: Right arm, BP Patient Position: Sitting, BP Cuff Size: Adult)   Pulse 83   Temp 98.2 °F (36.8 °C) (Oral)   Resp 20   Ht 5' 4\" (1.626 m)   Wt 186 lb (84.4 kg)   SpO2 98%   BMI 31.93 kg/m²     Physical Exam:   General appearance - alert, well appearing, and in no distress  Mental status - alert, oriented to person, place, and time  EYE-MIRA, EOMI, corneas normal, no foreign bodies  ENT-ENT exam normal, no neck nodes or sinus tenderness  Nose - normal and patent, no erythema, discharge or polyps  Mouth - mucous membranes moist, pharynx normal without lesions  Neck - supple, no significant adenopathy   Chest - clear to auscultation, no wheezes, rales or rhonchi, symmetric air entry   Heart - normal rate, regular rhythm, normal S1, S2, no murmurs, rubs, clicks or gallops   Abdomen - soft, nontender, nondistended, no masses or organomegaly  Lymph- no adenopathy palpable  Ext-peripheral pulses normal, no pedal edema, no clubbing or cyanosis  Skin-scattered Maculopapular eruptions rt. thigh  Neuro -alert, oriented, normal speech, no focal findings or movement disorder noted  Neck-normal C-spine, no tenderness, full ROM without pain  Feet-no nail deformities or callus formation with good pulses noted      Results for orders placed or performed in visit on 33/17/06   METABOLIC PANEL, BASIC   Result Value Ref Range    Sodium 138 136 - 145 mmol/L    Potassium 4.6 3.5 - 5.1 mmol/L    Chloride 107 97 - 108 mmol/L    CO2 27 21 - 32 mmol/L    Anion gap 4 (L) 5 - 15 mmol/L    Glucose 94 65 - 100 mg/dL    BUN 15 6 - 20 MG/DL    Creatinine 0.87 0.55 - 1.02 MG/DL    BUN/Creatinine ratio 17 12 - 20      GFR est AA >60 >60 ml/min/1.73m2    GFR est non-AA >60 >60 ml/min/1.73m2    Calcium 9.2 8.5 - 10.1 MG/DL   CBC W/O DIFF   Result Value Ref Range    WBC 5.5 3.6 - 11.0 K/uL    RBC 5.10 3.80 - 5.20 M/uL    HGB 13.7 11.5 - 16.0 g/dL    HCT 44.9 35.0 - 47.0 %    MCV 88.0 80.0 - 99.0 FL    MCH 26.9 26.0 - 34.0 PG    MCHC 30.5 30.0 - 36.5 g/dL    RDW 15.5 (H) 11.5 - 14.5 %    PLATELET 228 768 - 853 K/uL    MPV 12.5 8.9 - 12.9 FL    NRBC 0.0 0  WBC    ABSOLUTE NRBC 0.00 0.00 - 0.01 K/uL   HEPATITIS C AB   Result Value Ref Range    Hep C virus Ab Interp. NONREACTIVE NONREACTIVE     AMB POC LIPID PROFILE   Result Value Ref Range    Cholesterol (POC) 155     Triglycerides (POC) 62     HDL Cholesterol (POC) 49     Non-HDL Cholesterol 106     LDL Cholesterol (POC) 94 MG/DL    TChol/HDL Ratio (POC) 3.2        Assessment/Plan:    ICD-10-CM ICD-9-CM    1. Pityriasis rosea  L42 696.3      No orders of the defined types were placed in this encounter. Patient Instructions   [employer] Activation    Thank you for requesting access to [employer]. Please follow the instructions below to securely access and download your online medical record. [employer] allows you to send messages to your doctor, view your test results, renew your prescriptions, schedule appointments, and more. How Do I Sign Up? 1. In your internet browser, go to [email or web address]  2. Click on the First Time User? Click Here link in the Sign In box. You will be redirect to the New Member Sign Up page. 3. Enter your [employer] Access Code exactly as it appears below. You will not need to use this code after youve completed the sign-up process. If you do not sign up before the expiration date, you must request a new code. [employer] Access Code: Activation code not generated  Current [employer] Status: Active (This is the date your [employer] access code will )    4.  Enter the last four digits of your Social Security Number (xxxx) and Date of Birth (mm/dd/yyyy) as indicated and click Submit. You will be taken to the next sign-up page. 5. Create a [employer] ID. This will be your [employer] login ID and cannot be changed, so think of one that is secure and easy to remember. 6. Create a [employer] password. You can change your password at any time. 7. Enter your Password Reset Question and Answer. This can be used at a later time if you forget your password. 8. Enter your e-mail address. You will receive e-mail notification when new information is available in [address] 9. Click Sign Up. You can now view and download portions of your medical record. 10. Click the Download Summary menu link to download a portable copy of your medical information. Additional Information    If you have questions, please visit the Frequently Asked Questions section of the [employer] website at [email or web address] store[employer]/. Remember, [employer] is NOT to be used for urgent needs. For medical emergencies, dial 911. Follow-up and Dispositions    · Return in about 3 months (around 4/19/2022), or if symptoms worsen or fail to improve. I have reviewed with the patient details of the assessment and plan and all questions were answered. Relevent patient education was performed. The most recent lab findings were reviewed with the patient. An After Visit Summary was printed and given to the patient.

## 2022-04-25 ENCOUNTER — OFFICE VISIT (OUTPATIENT)
Dept: PODIATRY | Age: 57
End: 2022-04-25
Payer: COMMERCIAL

## 2022-04-25 ENCOUNTER — HOSPITAL ENCOUNTER (OUTPATIENT)
Dept: GENERAL RADIOLOGY | Age: 57
Discharge: HOME | End: 2022-04-25
Payer: COMMERCIAL

## 2022-04-25 VITALS
TEMPERATURE: 97.5 F | HEIGHT: 64 IN | BODY MASS INDEX: 31.76 KG/M2 | HEART RATE: 55 BPM | WEIGHT: 186 LBS | SYSTOLIC BLOOD PRESSURE: 153 MMHG | DIASTOLIC BLOOD PRESSURE: 87 MMHG

## 2022-04-25 DIAGNOSIS — M20.5X1 HALLUX LIMITUS OF RIGHT FOOT: ICD-10-CM

## 2022-04-25 DIAGNOSIS — M20.5X1 HALLUX LIMITUS OF RIGHT FOOT: Primary | ICD-10-CM

## 2022-04-25 PROCEDURE — 73630 X-RAY EXAM OF FOOT: CPT

## 2022-04-25 PROCEDURE — 99203 OFFICE O/P NEW LOW 30 MIN: CPT | Performed by: PODIATRIST
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
## 2022-08-10 ENCOUNTER — OFFICE VISIT (OUTPATIENT)
Dept: INTERNAL MEDICINE CLINIC | Age: 57
End: 2022-08-10
Payer: COMMERCIAL

## 2022-08-10 VITALS
TEMPERATURE: 98.5 F | RESPIRATION RATE: 20 BRPM | HEIGHT: 64 IN | BODY MASS INDEX: 31.24 KG/M2 | HEART RATE: 79 BPM | DIASTOLIC BLOOD PRESSURE: 80 MMHG | WEIGHT: 183 LBS | OXYGEN SATURATION: 96 % | SYSTOLIC BLOOD PRESSURE: 120 MMHG

## 2022-08-10 DIAGNOSIS — R63.5 WEIGHT GAIN: Primary | ICD-10-CM

## 2022-08-10 DIAGNOSIS — Z01.419 GYNECOLOGIC EXAM NORMAL: ICD-10-CM

## 2022-08-10 DIAGNOSIS — H53.9 VISUAL DISORDER: ICD-10-CM

## 2022-08-10 LAB
ALBUMIN SERPL-MCNC: 3.9 G/DL (ref 3.5–5)
ALBUMIN/GLOB SERPL: 1 {RATIO} (ref 1.1–2.2)
ALP SERPL-CCNC: 71 U/L (ref 45–117)
ALT SERPL-CCNC: 30 U/L (ref 12–78)
ANION GAP SERPL CALC-SCNC: 6 MMOL/L (ref 5–15)
AST SERPL-CCNC: 13 U/L (ref 15–37)
BILIRUB SERPL-MCNC: 0.3 MG/DL (ref 0.2–1)
BUN SERPL-MCNC: 15 MG/DL (ref 6–20)
BUN/CREAT SERPL: 15 (ref 12–20)
CALCIUM SERPL-MCNC: 9.6 MG/DL (ref 8.5–10.1)
CHLORIDE SERPL-SCNC: 111 MMOL/L (ref 97–108)
CO2 SERPL-SCNC: 27 MMOL/L (ref 21–32)
CREAT SERPL-MCNC: 1.02 MG/DL (ref 0.55–1.02)
ERYTHROCYTE [DISTWIDTH] IN BLOOD BY AUTOMATED COUNT: 14.4 % (ref 11.5–14.5)
GLOBULIN SER CALC-MCNC: 3.8 G/DL (ref 2–4)
GLUCOSE SERPL-MCNC: 99 MG/DL (ref 65–100)
HCT VFR BLD AUTO: 42.4 % (ref 35–47)
HGB BLD-MCNC: 13.7 G/DL (ref 11.5–16)
MCH RBC QN AUTO: 27.8 PG (ref 26–34)
MCHC RBC AUTO-ENTMCNC: 32.3 G/DL (ref 30–36.5)
MCV RBC AUTO: 86 FL (ref 80–99)
NRBC # BLD: 0 K/UL (ref 0–0.01)
NRBC BLD-RTO: 0 PER 100 WBC
PLATELET # BLD AUTO: 239 K/UL (ref 150–400)
PMV BLD AUTO: 12.2 FL (ref 8.9–12.9)
POTASSIUM SERPL-SCNC: 4.6 MMOL/L (ref 3.5–5.1)
PROT SERPL-MCNC: 7.7 G/DL (ref 6.4–8.2)
RBC # BLD AUTO: 4.93 M/UL (ref 3.8–5.2)
SODIUM SERPL-SCNC: 144 MMOL/L (ref 136–145)
TSH SERPL DL<=0.05 MIU/L-ACNC: 1.19 UIU/ML (ref 0.36–3.74)
WBC # BLD AUTO: 6.8 K/UL (ref 3.6–11)

## 2022-08-10 PROCEDURE — 99214 OFFICE O/P EST MOD 30 MIN: CPT | Performed by: INTERNAL MEDICINE

## 2022-08-10 NOTE — PROGRESS NOTES
Paul Lange is a 55 y.o. female and presents with Follow-up  . She needs a gyn referral    She has had some some eye lid problems    She has had some weight gain    Review of Systems  Constitutional: negative for fevers, chills, anorexia and weight loss  Eyes:   negative for visual disturbance and irritation  ENT:   negative for tinnitus,sore throat,nasal congestion,ear pains. hoarseness  Respiratory:  negative for cough, hemoptysis, dyspnea,wheezing  CV:   negative for chest pain, palpitations, lower extremity edema  GI:   negative for nausea, vomiting, diarrhea, abdominal pain,melena  Endo:               negative for polyuria,polydipsia,polyphagia,heat intolerance  Genitourinary: negative for frequency, dysuria and hematuria  Integument:  negative for rash and pruritus  Hematologic:  negative for easy bruising and gum/nose bleeding  Musculoskel: negative for myalgias, arthralgias, back pain, muscle weakness, joint pain  Neurological:  negative for headaches, dizziness, vertigo, memory problems and gait   Behavl/Psych: negative for feelings of anxiety, depression, mood changes    Past Medical History:   Diagnosis Date    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[facility]      Past Surgical History:   Procedure Laterality Date    COLONOSCOPY N/A 2017    COLONOSCOPY performed by Laurita Locke MD at [facility] ENDOSCOPY    HX  SECTION      HX GYN Right     lumpectomy    HX OOPHORECTOMY      partial      Social History     Socioeconomic History    Marital status: SINGLE   Tobacco Use    Smoking status: Never    Smokeless tobacco: Never   Vaping Use    Vaping Use: Never used   Substance and Sexual Activity    Alcohol use: No    Drug use: Never     Family History   Problem Relation Age of Onset    Heart Disease Father     Breast Cancer Maternal Grandmother 48     Current Outpatient Medications   Medication Sig Dispense Refill    predniSONE (DELTASONE) 10 mg tablet 6 tabs today and reduce by 1 tab daily (Patient not taking: No sig reported) 21 Tablet 0    triamcinolone acetonide (KENALOG) 0.1 % topical cream apply topically to affected area twice a day UNTIL RESOLVED (Patient not taking: No sig reported) 80 g 3     Allergies   Allergen Reactions    Bactrim [Sulfamethoprim] Rash    Nut Flavor Anaphylaxis    Nuts [Tree Nut] Anaphylaxis    Cafergot [Ergotamine-Caffeine] Nausea Only       Objective:  Visit Vitals  /80 (BP 1 Location: Right arm, BP Patient Position: Sitting, BP Cuff Size: Adult)   Pulse 79   Temp 98.5 °F (36.9 °C) (Oral)   Resp 20   Ht 5' 4\" (1.626 m)   Wt 183 lb (83 kg)   SpO2 96%   BMI 31.41 kg/m²     Physical Exam:   General appearance - alert, well appearing, and in no distress  Mental status - alert, oriented to person, place, and time  EYE-MIRA, EOMI, corneas normal, no foreign bodies  ENT-ENT exam normal, no neck nodes or sinus tenderness  Nose - normal and patent, no erythema, discharge or polyps  Mouth - mucous membranes moist, pharynx normal without lesions  Neck - supple, no significant adenopathy   Chest - clear to auscultation, no wheezes, rales or rhonchi, symmetric air entry   Heart - normal rate, regular rhythm, normal S1, S2, no murmurs, rubs, clicks or gallops   Abdomen - soft, nontender, nondistended, no masses or organomegaly  Lymph- no adenopathy palpable  Ext-peripheral pulses normal, no pedal edema, no clubbing or cyanosis  Skin-Warm and dry.  no hyperpigmentation, vitiligo, or suspicious lesions  Neuro -alert, oriented, normal speech, no focal findings or movement disorder noted  Neck-normal C-spine, no tenderness, full ROM without pain  Feet-no nail deformities or callus formation with good pulses noted      Results for orders placed or performed in visit on 06/64/79   METABOLIC PANEL, BASIC   Result Value Ref Range    Sodium 138 136 - 145 mmol/L    Potassium 4.6 3.5 - 5.1 mmol/L    Chloride 107 97 - 108 mmol/L    CO2 27 21 - 32 mmol/L    Anion gap 4 (L) 5 - 15 mmol/L    Glucose 94 65 - 100 mg/dL    BUN 15 6 - 20 MG/DL    Creatinine 0.87 0.55 - 1.02 MG/DL    BUN/Creatinine ratio 17 12 - 20      GFR est AA >60 >60 ml/min/1.73m2    GFR est non-AA >60 >60 ml/min/1.73m2    Calcium 9.2 8.5 - 10.1 MG/DL   CBC W/O DIFF   Result Value Ref Range    WBC 5.5 3.6 - 11.0 K/uL    RBC 5.10 3.80 - 5.20 M/uL    HGB 13.7 11.5 - 16.0 g/dL    HCT 44.9 35.0 - 47.0 %    MCV 88.0 80.0 - 99.0 FL    MCH 26.9 26.0 - 34.0 PG    MCHC 30.5 30.0 - 36.5 g/dL    RDW 15.5 (H) 11.5 - 14.5 %    PLATELET 712 735 - 348 K/uL    MPV 12.5 8.9 - 12.9 FL    NRBC 0.0 0  WBC    ABSOLUTE NRBC 0.00 0.00 - 0.01 K/uL   HEPATITIS C AB   Result Value Ref Range    Hep C virus Ab Interp. NONREACTIVE NONREACTIVE     AMB POC LIPID PROFILE   Result Value Ref Range    Cholesterol (POC) 155     Triglycerides (POC) 62     HDL Cholesterol (POC) 49     Non-HDL Cholesterol 106     LDL Cholesterol (POC) 94 MG/DL    TChol/HDL Ratio (POC) 3.2        Assessment/Plan:  No diagnosis found. No orders of the defined types were placed in this encounter. lose weight, routine labs ordered, call if any problems,Take 81mg aspirin daily  There are no Patient Instructions on file for this visit. I have reviewed with the patient details of the assessment and plan and all questions were answered. Relevent patient education was performed. The most recent lab findings were reviewed with the patient. An After Visit Summary was printed and given to the patient.

## 2022-08-10 NOTE — PATIENT INSTRUCTIONS
[employer] Activation    Thank you for requesting access to [employer] mobile. Please follow the instructions below to securely access and download your online medical record. [employer] mobile allows you to send messages to your doctor, view your test results, renew your prescriptions, schedule appointments, and more. How Do I Sign Up? In your internet browser, go to [email or web address]  Click on the First Time User? Click Here link in the Sign In box. You will be redirect to the New Member Sign Up page. Enter your [employer] mobile Access Code exactly as it appears below. You will not need to use this code after youve completed the sign-up process. If you do not sign up before the expiration date, you must request a new code. [employer] mobile Access Code: Activation code not generated  Current [employer] mobile Status: Active (This is the date your Kickanotch mobile access code will )    Enter the last four digits of your Social Security Number (xxxx) and Date of Birth (mm/dd/yyyy) as indicated and click Submit. You will be taken to the next sign-up page. Create a [employer] mobile ID. This will be your [employer] mobile login ID and cannot be changed, so think of one that is secure and easy to remember. Create a [employer] mobile password. You can change your password at any time. Enter your Password Reset Question and Answer. This can be used at a later time if you forget your password. Enter your e-mail address. You will receive e-mail notification when new information is available in [address] Click Sign Up. You can now view and download portions of your medical record. Click the [place] link to download a portable copy of your medical information. Additional Information    If you have questions, please visit the Frequently Asked Questions section of the [employer] mobile website at [email or web address] Remember, [employer] mobile is NOT to be used for urgent needs. For medical emergencies, dial 911.

## 2022-12-19 ENCOUNTER — OFFICE VISIT (OUTPATIENT)
Dept: INTERNAL MEDICINE CLINIC | Age: 57
End: 2022-12-19
Payer: COMMERCIAL

## 2022-12-19 VITALS
WEIGHT: 182 LBS | TEMPERATURE: 98.2 F | HEIGHT: 64 IN | RESPIRATION RATE: 18 BRPM | BODY MASS INDEX: 31.07 KG/M2 | HEART RATE: 62 BPM | OXYGEN SATURATION: 97 % | SYSTOLIC BLOOD PRESSURE: 110 MMHG | DIASTOLIC BLOOD PRESSURE: 70 MMHG

## 2022-12-19 DIAGNOSIS — L30.9 ECZEMA, UNSPECIFIED TYPE: Primary | ICD-10-CM

## 2022-12-19 PROCEDURE — 99213 OFFICE O/P EST LOW 20 MIN: CPT | Performed by: INTERNAL MEDICINE

## 2022-12-19 RX ORDER — TRIAMCINOLONE ACETONIDE 1 MG/G
CREAM TOPICAL
Qty: 80 G | Refills: 3 | COMMUNITY
Start: 2022-12-19

## 2022-12-19 NOTE — PROGRESS NOTES
Mylene Johnson is a 65 y.o. female and presents with No chief complaint on file. .  Subjective:    She needs a general evaluation  She offers no  new complaints        Review of Systems  Constitutional: negative for fevers, chills, anorexia and weight loss  Eyes:   negative for visual disturbance and irritation  ENT:   negative for tinnitus,sore throat,nasal congestion,ear pains. hoarseness  Respiratory:  negative for cough, hemoptysis, dyspnea,wheezing  CV:   negative for chest pain, palpitations, lower extremity edema  GI:   negative for nausea, vomiting, diarrhea, abdominal pain,melena  Endo:               negative for polyuria,polydipsia,polyphagia,heat intolerance  Genitourinary: negative for frequency, dysuria and hematuria  Integument:  negative for rash and pruritus  Hematologic:  negative for easy bruising and gum/nose bleeding  Musculoskel: negative for myalgias, arthralgias, back pain, muscle weakness, joint pain  Neurological:  negative for headaches, dizziness, vertigo, memory problems and gait   Behavl/Psych: negative for feelings of anxiety, depression, mood changes    Past Medical History:   Diagnosis Date    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[address]      Past Surgical History:   Procedure Laterality Date    COLONOSCOPY N/A 2017    COLONOSCOPY performed by Shelley Lima MD at [facility] ENDOSCOPY    HX  SECTION      HX GYN Right     lumpectomy    HX OOPHORECTOMY      partial      Social History     Socioeconomic History    Marital status: SINGLE   Tobacco Use    Smoking status: Never    Smokeless tobacco: Never   Vaping Use    Vaping Use: Never used   Substance and Sexual Activity    Alcohol use: No    Drug use: Never     Family History   Problem Relation Age of Onset    Heart Disease Father     Breast Cancer Maternal Grandmother 48     Current Outpatient Medications   Medication Sig Dispense Refill    triamcinolone acetonide (KENALOG) 0.1 % topical cream apply topically to affected area twice a day UNTIL RESOLVED 80 g 3     Allergies   Allergen Reactions    Bactrim [Sulfamethoprim] Rash    Nut Flavor Anaphylaxis    Nuts [Tree Nut] Anaphylaxis    Cafergot [Ergotamine-Caffeine] Nausea Only       Objective:  Visit Vitals  /70 (BP 1 Location: Right arm, BP Patient Position: Sitting, BP Cuff Size: Adult)   Pulse 62   Temp 98.2 °F (36.8 °C) (Oral)   Resp 18   Ht 5' 4\" (1.626 m)   Wt 182 lb (82.6 kg)   SpO2 97%   BMI 31.24 kg/m²     Physical Exam:   General appearance - alert, well appearing, and in no distress  Mental status - alert, oriented to person, place, and time  EYE-MIRA, EOMI, corneas normal, no foreign bodies  ENT-ENT exam normal, no neck nodes or sinus tenderness  Nose - normal and patent, no erythema, discharge or polyps  Mouth - mucous membranes moist, pharynx normal without lesions  Neck - supple, no significant adenopathy   Chest - clear to auscultation, no wheezes, rales or rhonchi, symmetric air entry   Heart - normal rate, regular rhythm, normal S1, S2, no murmurs, rubs, clicks or gallops   Abdomen - soft, nontender, nondistended, no masses or organomegaly  Lymph- no adenopathy palpable  Ext-peripheral pulses normal, no pedal edema, no clubbing or cyanosis  Skin-Warm and dry.  no hyperpigmentation, vitiligo, or suspicious lesions  Neuro -alert, oriented, normal speech, no focal findings or movement disorder noted  Neck-normal C-spine, no tenderness, full ROM without pain  Feet-no nail deformities or callus formation with good pulses noted      Results for orders placed or performed in visit on 18/55/98   METABOLIC PANEL, COMPREHENSIVE   Result Value Ref Range    Sodium 144 136 - 145 mmol/L    Potassium 4.6 3.5 - 5.1 mmol/L    Chloride 111 (H) 97 - 108 mmol/L    CO2 27 21 - 32 mmol/L    Anion gap 6 5 - 15 mmol/L    Glucose 99 65 - 100 mg/dL    BUN 15 6 - 20 MG/DL    Creatinine 1.02 0.55 - 1.02 MG/DL    BUN/Creatinine ratio 15 12 - 20      GFR est AA >60 >60 ml/min/1.73m2    GFR est non-AA 56 (L) >60 ml/min/1.73m2    Calcium 9.6 8.5 - 10.1 MG/DL    Bilirubin, total 0.3 0.2 - 1.0 MG/DL    ALT (SGPT) 30 12 - 78 U/L    AST (SGOT) 13 (L) 15 - 37 U/L    Alk. phosphatase 71 45 - 117 U/L    Protein, total 7.7 6.4 - 8.2 g/dL    Albumin 3.9 3.5 - 5.0 g/dL    Globulin 3.8 2.0 - 4.0 g/dL    A-G Ratio 1.0 (L) 1.1 - 2.2     TSH 3RD GENERATION   Result Value Ref Range    TSH 1.19 0.36 - 3.74 uIU/mL   CBC W/O DIFF   Result Value Ref Range    WBC 6.8 3.6 - 11.0 K/uL    RBC 4.93 3.80 - 5.20 M/uL    HGB 13.7 11.5 - 16.0 g/dL    HCT 42.4 35.0 - 47.0 %    MCV 86.0 80.0 - 99.0 FL    MCH 27.8 26.0 - 34.0 PG    MCHC 32.3 30.0 - 36.5 g/dL    RDW 14.4 11.5 - 14.5 %    PLATELET 993 704 - 069 K/uL    MPV 12.2 8.9 - 12.9 FL    NRBC 0.0 0  WBC    ABSOLUTE NRBC 0.00 0.00 - 0.01 K/uL       Assessment/Plan:    ICD-10-CM ICD-9-CM    1. Eczema, unspecified type  L30.9 692.9         Orders Placed This Encounter    triamcinolone acetonide (KENALOG) 0.1 % topical cream     Sig: apply topically to affected area twice a day UNTIL RESOLVED     Dispense:  80 g     Refill:  3     call if any problems,Take 81mg aspirin daily  Patient Instructions   [employer] Activation    Thank you for requesting access to [employer]. Please follow the instructions below to securely access and download your online medical record. [employer] allows you to send messages to your doctor, view your test results, renew your prescriptions, schedule appointments, and more. How Do I Sign Up? In your internet browser, go to [email or web address]  Click on the First Time User? Click Here link in the Sign In box. You will be redirect to the New Member Sign Up page. Enter your [employer] Access Code exactly as it appears below. You will not need to use this code after youve completed the sign-up process. If you do not sign up before the expiration date, you must request a new code. [employer] Access Code:  Activation code not generated  Current [employer] Status: Active (This is the date your [employer] access code will )    Enter the last four digits of your Social Security Number (xxxx) and Date of Birth (mm/dd/yyyy) as indicated and click Submit. You will be taken to the next sign-up page. Create a [employer] ID. This will be your [employer] login ID and cannot be changed, so think of one that is secure and easy to remember. Create a [employer] password. You can change your password at any time. Enter your Password Reset Question and Answer. This can be used at a later time if you forget your password. Enter your e-mail address. You will receive e-mail notification when new information is available in [address] Click Sign Up. You can now view and download portions of your medical record. Click the "[employer] link to download a portable copy of your medical information. Additional Information    If you have questions, please visit the Frequently Asked Questions section of the [employer] website at [email or web address] Remember, [employer] is NOT to be used for urgent needs. For medical emergencies, dial 911. Follow-up and Dispositions    Return in about 6 months (around 2023), or if symptoms worsen or fail to improve. I have reviewed with the patient details of the assessment and plan and all questions were answered. Relevent patient education was performed. The most recent lab findings were reviewed with the patient. An After Visit Summary was printed and given to the patient.

## 2022-12-19 NOTE — PROGRESS NOTES
Chief Complaint   Patient presents with    Travel Consult     3 most recent PHQ Screens 12/19/2022   PHQ Not Done -   Little interest or pleasure in doing things Not at all   Feeling down, depressed, irritable, or hopeless Not at all   Total Score PHQ 2 0     1. Have you been to the ER, urgent care clinic since your last visit? Hospitalized since your last visit?no    2. Have you seen or consulted any other health care providers outside of the [address] [place] [address] since your last visit?   Include any pap smears or colon screening. no  .

## 2023-01-19 RX ORDER — AZITHROMYCIN 250 MG/1
TABLET, FILM COATED ORAL
Qty: 6 TABLET | Refills: 0 | Status: SHIPPED | OUTPATIENT
Start: 2023-01-19

## 2023-08-28 NOTE — PROGRESS NOTES
Chief Complaint   Patient presents with    Follow-up     3 most recent PHQ Screens 8/10/2022   PHQ Not Done -   Little interest or pleasure in doing things Not at all   Feeling down, depressed, irritable, or hopeless Not at all   Total Score PHQ 2 0     1. Have you been to the ER, urgent care clinic since your last visit? Hospitalized since your last visit?no    2. Have you seen or consulted any other health care providers outside of the [address] [place] [address] since your last visit? Include any pap smears or colon screening.  no Surgeon Performing Repair (Optional): Ace Gan MD

## 2024-06-24 ENCOUNTER — OFFICE VISIT (OUTPATIENT)
Facility: CLINIC | Age: 59
End: 2024-06-24
Payer: COMMERCIAL

## 2024-06-24 VITALS
OXYGEN SATURATION: 97 % | HEART RATE: 90 BPM | HEIGHT: 64 IN | RESPIRATION RATE: 20 BRPM | TEMPERATURE: 98.7 F | WEIGHT: 196 LBS | BODY MASS INDEX: 33.46 KG/M2 | DIASTOLIC BLOOD PRESSURE: 70 MMHG | SYSTOLIC BLOOD PRESSURE: 110 MMHG

## 2024-06-24 DIAGNOSIS — Z11.4 SCREENING FOR HIV (HUMAN IMMUNODEFICIENCY VIRUS): ICD-10-CM

## 2024-06-24 DIAGNOSIS — Z00.00 WELL ADULT EXAM: Primary | ICD-10-CM

## 2024-06-24 DIAGNOSIS — Z13.220 SCREENING CHOLESTEROL LEVEL: ICD-10-CM

## 2024-06-24 DIAGNOSIS — Z12.31 ENCOUNTER FOR SCREENING MAMMOGRAM FOR MALIGNANT NEOPLASM OF BREAST: ICD-10-CM

## 2024-06-24 LAB
ALBUMIN SERPL-MCNC: 4.3 G/DL (ref 3.5–5)
ALBUMIN/GLOB SERPL: 1.1 (ref 1.1–2.2)
ALP SERPL-CCNC: 71 U/L (ref 45–117)
ALT SERPL-CCNC: 28 U/L (ref 12–78)
ANION GAP SERPL CALC-SCNC: 7 MMOL/L (ref 5–15)
AST SERPL-CCNC: 16 U/L (ref 15–37)
BILIRUB SERPL-MCNC: 0.4 MG/DL (ref 0.2–1)
BUN SERPL-MCNC: 23 MG/DL (ref 6–20)
BUN/CREAT SERPL: 18 (ref 12–20)
CALCIUM SERPL-MCNC: 9.4 MG/DL (ref 8.5–10.1)
CHLORIDE SERPL-SCNC: 113 MMOL/L (ref 97–108)
CHOLEST SERPL-MCNC: 188 MG/DL
CO2 SERPL-SCNC: 22 MMOL/L (ref 21–32)
CREAT SERPL-MCNC: 1.25 MG/DL (ref 0.55–1.02)
ERYTHROCYTE [DISTWIDTH] IN BLOOD BY AUTOMATED COUNT: 14.6 % (ref 11.5–14.5)
GLOBULIN SER CALC-MCNC: 4 G/DL (ref 2–4)
GLUCOSE SERPL-MCNC: 100 MG/DL (ref 65–100)
HCT VFR BLD AUTO: 44.2 % (ref 35–47)
HDLC SERPL-MCNC: 51 MG/DL
HDLC SERPL: 3.7 (ref 0–5)
HGB BLD-MCNC: 14.1 G/DL (ref 11.5–16)
HIV 1+2 AB+HIV1 P24 AG SERPL QL IA: NONREACTIVE
HIV 1/2 RESULT COMMENT: NORMAL
LDLC SERPL CALC-MCNC: 107.4 MG/DL (ref 0–100)
MCH RBC QN AUTO: 27.2 PG (ref 26–34)
MCHC RBC AUTO-ENTMCNC: 31.9 G/DL (ref 30–36.5)
MCV RBC AUTO: 85.3 FL (ref 80–99)
NRBC # BLD: 0 K/UL (ref 0–0.01)
NRBC BLD-RTO: 0 PER 100 WBC
PLATELET # BLD AUTO: 214 K/UL (ref 150–400)
PMV BLD AUTO: 12.2 FL (ref 8.9–12.9)
POTASSIUM SERPL-SCNC: 4.8 MMOL/L (ref 3.5–5.1)
PROT SERPL-MCNC: 8.3 G/DL (ref 6.4–8.2)
RBC # BLD AUTO: 5.18 M/UL (ref 3.8–5.2)
SODIUM SERPL-SCNC: 142 MMOL/L (ref 136–145)
TRIGL SERPL-MCNC: 148 MG/DL
VLDLC SERPL CALC-MCNC: 29.6 MG/DL
WBC # BLD AUTO: 7.4 K/UL (ref 3.6–11)

## 2024-06-24 PROCEDURE — 99396 PREV VISIT EST AGE 40-64: CPT | Performed by: INTERNAL MEDICINE

## 2024-06-24 SDOH — ECONOMIC STABILITY: INCOME INSECURITY: HOW HARD IS IT FOR YOU TO PAY FOR THE VERY BASICS LIKE FOOD, HOUSING, MEDICAL CARE, AND HEATING?: NOT VERY HARD

## 2024-06-24 SDOH — ECONOMIC STABILITY: FOOD INSECURITY: WITHIN THE PAST 12 MONTHS, THE FOOD YOU BOUGHT JUST DIDN'T LAST AND YOU DIDN'T HAVE MONEY TO GET MORE.: NEVER TRUE

## 2024-06-24 SDOH — ECONOMIC STABILITY: FOOD INSECURITY: WITHIN THE PAST 12 MONTHS, YOU WORRIED THAT YOUR FOOD WOULD RUN OUT BEFORE YOU GOT MONEY TO BUY MORE.: NEVER TRUE

## 2024-06-24 SDOH — ECONOMIC STABILITY: HOUSING INSECURITY
IN THE LAST 12 MONTHS, WAS THERE A TIME WHEN YOU DID NOT HAVE A STEADY PLACE TO SLEEP OR SLEPT IN A SHELTER (INCLUDING NOW)?: NO

## 2024-06-24 ASSESSMENT — PATIENT HEALTH QUESTIONNAIRE - PHQ9
SUM OF ALL RESPONSES TO PHQ9 QUESTIONS 1 & 2: 0
SUM OF ALL RESPONSES TO PHQ QUESTIONS 1-9: 0
SUM OF ALL RESPONSES TO PHQ QUESTIONS 1-9: 0
2. FEELING DOWN, DEPRESSED OR HOPELESS: NOT AT ALL
SUM OF ALL RESPONSES TO PHQ QUESTIONS 1-9: 0
SUM OF ALL RESPONSES TO PHQ QUESTIONS 1-9: 0
1. LITTLE INTEREST OR PLEASURE IN DOING THINGS: NOT AT ALL
1. LITTLE INTEREST OR PLEASURE IN DOING THINGS: NOT AT ALL
SUM OF ALL RESPONSES TO PHQ QUESTIONS 1-9: 0
SUM OF ALL RESPONSES TO PHQ9 QUESTIONS 1 & 2: 0
SUM OF ALL RESPONSES TO PHQ QUESTIONS 1-9: 0
2. FEELING DOWN, DEPRESSED OR HOPELESS: NOT AT ALL
SUM OF ALL RESPONSES TO PHQ QUESTIONS 1-9: 0
SUM OF ALL RESPONSES TO PHQ QUESTIONS 1-9: 0

## 2024-06-24 ASSESSMENT — ANXIETY QUESTIONNAIRES
2. NOT BEING ABLE TO STOP OR CONTROL WORRYING: NOT AT ALL
GAD7 TOTAL SCORE: 0
IF YOU CHECKED OFF ANY PROBLEMS ON THIS QUESTIONNAIRE, HOW DIFFICULT HAVE THESE PROBLEMS MADE IT FOR YOU TO DO YOUR WORK, TAKE CARE OF THINGS AT HOME, OR GET ALONG WITH OTHER PEOPLE: NOT DIFFICULT AT ALL
4. TROUBLE RELAXING: NOT AT ALL
7. FEELING AFRAID AS IF SOMETHING AWFUL MIGHT HAPPEN: NOT AT ALL
5. BEING SO RESTLESS THAT IT IS HARD TO SIT STILL: NOT AT ALL
3. WORRYING TOO MUCH ABOUT DIFFERENT THINGS: NOT AT ALL
6. BECOMING EASILY ANNOYED OR IRRITABLE: NOT AT ALL

## 2024-06-24 NOTE — PATIENT INSTRUCTIONS
Thank you for enrolling in [employer]. Please follow the instructions below to securely access your online medical record. [employer] allows you to send messages to your doctor, view your test results, renew your prescriptions, schedule appointments, and more.     How Do I Sign Up?  In your Internet browser, go to [email or web address]  Click on the Sign Up Now link in the Sign In box. You will see the New Member Sign Up page.  Enter your [employer] Access Code exactly as it appears below. You will not need to use this code after you’ve completed the sign-up process. If you do not sign up before the expiration date, you must request a new code.  [employer]iq Access Code: Activation code not generated  Current [employer]iq Status: Active    Enter your Social Security Number ([id number]) and Date of Birth (mm/dd/yyyy) as indicated and click Submit. You will be taken to the next sign-up page.  Create a Physiq ID. This will be your Physiq login ID and cannot be changed, so think of one that is secure and easy to remember.  Create a Physiq password. You can change your password at any time.  Enter your Password Reset Question and Answer. This can be used at a later time if you forget your password.   Enter your e-mail address. You will receive e-mail notification when new information is available in [employer].  Click Sign Up. You can now view your medical record.     Additional Information  If you have questions, please contact your physician practice where you receive care. Remember, Physiq is NOT to be used for urgent needs. For medical emergencies, dial 911.

## 2024-06-24 NOTE — PROGRESS NOTES
Michelle George is a 58 y.o. female and presents with Annual Exam  .  Subjective:  She needs a well exam,  She offers no new complaints  She is not on any medication    Health maintenance suggest the need for hiv    Healthmaintance suggest the need for a mammogram.          Review of Systems  Constitutional: negative for fevers, chills, anorexia and weight loss  Eyes:   negative for visual disturbance and irritation  ENT:   negative for tinnitus,sore throat,nasal congestion,ear pains.hoarseness  Respiratory:  negative for cough, hemoptysis, dyspnea,wheezing  CV:   negative for chest pain, palpitations, lower extremity edema  GI:   negative for nausea, vomiting, diarrhea, abdominal pain,melena  Endo:               negative for polyuria,polydipsia,polyphagia,heat intolerance  Genitourinary: negative for frequency, dysuria and hematuria  Integument:  negative for rash and pruritus  Hematologic:  negative for easy bruising and gum/nose bleeding  Musculoskel: negative for myalgias, arthralgias, back pain, muscle weakness, joint pain  Neurological:  negative for headaches, dizziness, vertigo, memory problems and gait   Behavl/Psych: negative for feelings of anxiety, depression, mood changes    Past Medical History:   Diagnosis Date    Camara-Pawan syndrome (HCC)      Past Surgical History:   Procedure Laterality Date     SECTION      GYN Right     lumpectomy    OVARY REMOVAL      partial      Social History     Socioeconomic History    Marital status:      Spouse name: None    Number of children: None    Years of education: None    Highest education level: None   Tobacco Use    Smoking status: Never    Smokeless tobacco: Never   Substance and Sexual Activity    Alcohol use: No    Drug use: Never     Social Determinants of Health     Financial Resource Strain: Low Risk  (2024)    Overall Financial Resource Strain (CARDIA)     Difficulty of Paying Living Expenses: Not very hard   Food Insecurity: No

## 2024-06-24 NOTE — PROGRESS NOTES
\"Have you been to the ER, urgent care clinic since your last visit?  Hospitalized since your last visit?\"    no    “Have you seen or consulted any other health care providers outside of [facility] since your last visit?”    no    Have you had a mammogram?”   yes    Date of last Mammogram: 10/22/2021      “Have you had a pap smear?”    No      Date of last Cervical Cancer screen (HPV or PAP): 5/12/2016             Click Here for Release of Records Request

## (undated) DEVICE — Z DISCONTINUED PER MEDLINE LINE GAS SAMPLING O2/CO2 LNG AD 13 FT NSL W/ TBNG FILTERLINE

## (undated) DEVICE — TOWEL 4 PLY TISS 19X30 SUE WHT

## (undated) DEVICE — (D)SYR 10ML 1/5ML GRAD NSAF -- PKGING CHANGE USE ITEM 338027

## (undated) DEVICE — CUFF ADULT 1 PC 1 VINYL DISP --

## (undated) DEVICE — 1200 GUARD II KIT W/5MM TUBE W/O VAC TUBE: Brand: GUARDIAN

## (undated) DEVICE — SOLIDIFIER MEDC 1200ML -- CONVERT TO 356117

## (undated) DEVICE — CATH IV AUTOGRD BC PNK 20GA 25 -- INSYTE

## (undated) DEVICE — SET ADMIN 16ML TBNG L100IN 2 Y INJ SITE IV PIGGY BK DISP

## (undated) DEVICE — NON-REM POLYHESIVE PATIENT RETURN ELECTRODE: Brand: VALLEYLAB

## (undated) DEVICE — SYR 3ML LL TIP 1/10ML GRAD --

## (undated) DEVICE — BASIN EMESIS 500CC ROSE 250/CS 60/PLT: Brand: MEDEGEN MEDICAL PRODUCTS, LLC

## (undated) DEVICE — Device: Brand: MEDEX

## (undated) DEVICE — TRAP SUC MUCOUS 70ML -- MEDICHOICE MEDLINE

## (undated) DEVICE — CONTAINER SPEC 20 ML LID NEUT BUFF FORMALIN 10 % POLYPR STS

## (undated) DEVICE — KENDALL RADIOLUCENT FOAM MONITORING ELECTRODE RECTANGULAR SHAPE: Brand: KENDALL

## (undated) DEVICE — SNARE ENDOSCP M L240CM W27MM SHTH DIA2.4MM CHN 2.8MM OVL

## (undated) DEVICE — NEEDLE HYPO 18GA L1.5IN PNK S STL HUB POLYPR SHLD REG BVL

## (undated) DEVICE — Device

## (undated) DEVICE — STRAINER URIN CALC RNL MSH -- CONVERT TO ITEM 357634

## (undated) DEVICE — NEONATAL-ADULT SPO2 SENSOR: Brand: NELLCOR